# Patient Record
Sex: MALE | Race: WHITE | NOT HISPANIC OR LATINO | Employment: STUDENT | URBAN - METROPOLITAN AREA
[De-identification: names, ages, dates, MRNs, and addresses within clinical notes are randomized per-mention and may not be internally consistent; named-entity substitution may affect disease eponyms.]

---
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
## 2022-10-24 ENCOUNTER — OFFICE VISIT (OUTPATIENT)
Dept: URGENT CARE | Facility: CLINIC | Age: 22
End: 2022-10-24
Payer: COMMERCIAL

## 2022-10-24 VITALS
DIASTOLIC BLOOD PRESSURE: 85 MMHG | RESPIRATION RATE: 16 BRPM | WEIGHT: 117 LBS | BODY MASS INDEX: 19.97 KG/M2 | OXYGEN SATURATION: 98 % | HEIGHT: 64 IN | SYSTOLIC BLOOD PRESSURE: 134 MMHG | HEART RATE: 95 BPM | TEMPERATURE: 100 F

## 2022-10-24 DIAGNOSIS — E86.0 DEHYDRATION: ICD-10-CM

## 2022-10-24 DIAGNOSIS — R11.10 VOMITING, UNSPECIFIED VOMITING TYPE, UNSPECIFIED WHETHER NAUSEA PRESENT: Primary | ICD-10-CM

## 2022-10-24 DIAGNOSIS — R50.9 FEVER, UNSPECIFIED FEVER CAUSE: ICD-10-CM

## 2022-10-24 DIAGNOSIS — R19.7 DIARRHEA, UNSPECIFIED TYPE: ICD-10-CM

## 2022-10-24 LAB
CTP QC/QA: YES
CTP QC/QA: YES
POC MOLECULAR INFLUENZA A AGN: NEGATIVE
POC MOLECULAR INFLUENZA B AGN: NEGATIVE
SARS-COV-2 RDRP RESP QL NAA+PROBE: NEGATIVE

## 2022-10-24 PROCEDURE — 1159F MED LIST DOCD IN RCRD: CPT | Mod: CPTII,S$GLB,, | Performed by: FAMILY MEDICINE

## 2022-10-24 PROCEDURE — 87635: ICD-10-PCS | Mod: QW,S$GLB,, | Performed by: FAMILY MEDICINE

## 2022-10-24 PROCEDURE — 3075F PR MOST RECENT SYSTOLIC BLOOD PRESS GE 130-139MM HG: ICD-10-PCS | Mod: CPTII,S$GLB,, | Performed by: FAMILY MEDICINE

## 2022-10-24 PROCEDURE — 3075F SYST BP GE 130 - 139MM HG: CPT | Mod: CPTII,S$GLB,, | Performed by: FAMILY MEDICINE

## 2022-10-24 PROCEDURE — 1160F PR REVIEW ALL MEDS BY PRESCRIBER/CLIN PHARMACIST DOCUMENTED: ICD-10-PCS | Mod: CPTII,S$GLB,, | Performed by: FAMILY MEDICINE

## 2022-10-24 PROCEDURE — 96372 PR INJECTION,THERAP/PROPH/DIAG2ST, IM OR SUBCUT: ICD-10-PCS | Mod: S$GLB,,, | Performed by: FAMILY MEDICINE

## 2022-10-24 PROCEDURE — 3079F PR MOST RECENT DIASTOLIC BLOOD PRESSURE 80-89 MM HG: ICD-10-PCS | Mod: CPTII,S$GLB,, | Performed by: FAMILY MEDICINE

## 2022-10-24 PROCEDURE — 99214 PR OFFICE/OUTPT VISIT, EST, LEVL IV, 30-39 MIN: ICD-10-PCS | Mod: 25,S$GLB,, | Performed by: FAMILY MEDICINE

## 2022-10-24 PROCEDURE — 87502 POCT INFLUENZA A/B MOLECULAR: ICD-10-PCS | Mod: QW,S$GLB,, | Performed by: FAMILY MEDICINE

## 2022-10-24 PROCEDURE — 87635 SARS-COV-2 COVID-19 AMP PRB: CPT | Mod: QW,S$GLB,, | Performed by: FAMILY MEDICINE

## 2022-10-24 PROCEDURE — 87502 INFLUENZA DNA AMP PROBE: CPT | Mod: QW,S$GLB,, | Performed by: FAMILY MEDICINE

## 2022-10-24 PROCEDURE — 1160F RVW MEDS BY RX/DR IN RCRD: CPT | Mod: CPTII,S$GLB,, | Performed by: FAMILY MEDICINE

## 2022-10-24 PROCEDURE — 99214 OFFICE O/P EST MOD 30 MIN: CPT | Mod: 25,S$GLB,, | Performed by: FAMILY MEDICINE

## 2022-10-24 PROCEDURE — 1159F PR MEDICATION LIST DOCUMENTED IN MEDICAL RECORD: ICD-10-PCS | Mod: CPTII,S$GLB,, | Performed by: FAMILY MEDICINE

## 2022-10-24 PROCEDURE — 3079F DIAST BP 80-89 MM HG: CPT | Mod: CPTII,S$GLB,, | Performed by: FAMILY MEDICINE

## 2022-10-24 PROCEDURE — 96372 THER/PROPH/DIAG INJ SC/IM: CPT | Mod: S$GLB,,, | Performed by: FAMILY MEDICINE

## 2022-10-24 RX ORDER — ONDANSETRON 2 MG/ML
8 INJECTION INTRAMUSCULAR; INTRAVENOUS
Status: DISCONTINUED | OUTPATIENT
Start: 2022-10-24 | End: 2022-10-24

## 2022-10-24 RX ORDER — SODIUM CHLORIDE 9 MG/ML
INJECTION, SOLUTION INTRAVENOUS
Status: COMPLETED | OUTPATIENT
Start: 2022-10-24 | End: 2022-10-24

## 2022-10-24 RX ORDER — ONDANSETRON 2 MG/ML
4 INJECTION INTRAMUSCULAR; INTRAVENOUS
Status: COMPLETED | OUTPATIENT
Start: 2022-10-24 | End: 2022-10-24

## 2022-10-24 RX ORDER — ONDANSETRON 4 MG/1
4 TABLET, ORALLY DISINTEGRATING ORAL EVERY 8 HOURS PRN
Qty: 9 TABLET | Refills: 0 | Status: SHIPPED | OUTPATIENT
Start: 2022-10-24 | End: 2022-10-27

## 2022-10-24 RX ADMIN — ONDANSETRON 4 MG: 2 INJECTION INTRAMUSCULAR; INTRAVENOUS at 04:10

## 2022-10-24 RX ADMIN — SODIUM CHLORIDE: 9 INJECTION, SOLUTION INTRAVENOUS at 04:10

## 2022-10-24 NOTE — PROGRESS NOTES
"Subjective:       Patient ID: Lane Blancas is a 22 y.o. male.    Vitals:  height is 5' 4" (1.626 m) and weight is 53.1 kg (117 lb). His temperature is 100.3 °F (37.9 °C). His blood pressure is 134/85 and his pulse is 95. His respiration is 16 and oxygen saturation is 98%.     Chief Complaint: Emesis    Patient reports vomiting, diarrhea, chills, fatigue and body aches that started at 6am.  Pt denies  CP, SOB, abdominal pain, dysuria, loss of smell or taste.        Emesis   This is a new problem. The current episode started today. The emesis has an appearance of bile and stomach contents. There has been no fever.     Gastrointestinal:  Positive for vomiting.     Objective:      Physical Exam   Constitutional: He is oriented to person, place, and time. He appears well-developed. He is cooperative.  Non-toxic appearance. He does not appear ill. No distress.   HENT:   Head: Normocephalic and atraumatic.   Ears:   Right Ear: Hearing, tympanic membrane, external ear and ear canal normal. impacted cerumen  Left Ear: Hearing, tympanic membrane, external ear and ear canal normal. impacted cerumen  Nose: Nose normal. No mucosal edema, rhinorrhea, nasal deformity or congestion. No epistaxis. Right sinus exhibits no maxillary sinus tenderness and no frontal sinus tenderness. Left sinus exhibits no maxillary sinus tenderness and no frontal sinus tenderness.   Mouth/Throat: Uvula is midline, oropharynx is clear and moist and mucous membranes are normal. No trismus in the jaw. Normal dentition. No uvula swelling. No oropharyngeal exudate, posterior oropharyngeal edema or posterior oropharyngeal erythema.   Eyes: Conjunctivae and lids are normal. No scleral icterus.   Neck: Trachea normal and phonation normal. Neck supple. No edema present. No erythema present. No neck rigidity present.   Cardiovascular: Normal rate, regular rhythm, normal heart sounds and normal pulses.   No murmur heard.  Pulmonary/Chest: Effort normal " and breath sounds normal. No stridor. No respiratory distress. He has no decreased breath sounds. He has no wheezes. He has no rhonchi. He has no rales.   Abdominal: Normal appearance.   Musculoskeletal: Normal range of motion.         General: No deformity. Normal range of motion.      Cervical back: He exhibits no tenderness.   Lymphadenopathy:     He has no cervical adenopathy.   Neurological: He is alert and oriented to person, place, and time. He exhibits normal muscle tone. Coordination normal.   Skin: Skin is warm, dry, intact, not diaphoretic and not pale.   Psychiatric: His speech is normal and behavior is normal. Judgment and thought content normal.   Nursing note and vitals reviewed.      Assessment:       1. Vomiting, unspecified vomiting type, unspecified whether nausea present    2. Fever, unspecified fever cause    3. Diarrhea, unspecified type    4. Dehydration          Plan:     Patient is feeling much better after IV fluids.   Discussed results/diagnosis/plan with patient in clinic. Advised to f/u with PCP within 2-5 days. ER precautions given if symptoms get any worse. All questions answered. Patient verbally understood and agreed with treatment plan.   Vomiting, unspecified vomiting type, unspecified whether nausea present  -     POCT Influenza A/B MOLECULAR  -     0.9%  NaCl infusion    Fever, unspecified fever cause  -     POCT COVID-19 Rapid Screening    Diarrhea, unspecified type  -     0.9%  NaCl infusion    Dehydration    Other orders  -     Discontinue: ondansetron injection 8 mg  -     ondansetron injection 4 mg  -     ondansetron (ZOFRAN-ODT) 4 MG TbDL; Take 1 tablet (4 mg total) by mouth every 8 (eight) hours as needed (nausea).  Dispense: 9 tablet; Refill: 0

## 2022-11-30 ENCOUNTER — OFFICE VISIT (OUTPATIENT)
Dept: INTERNAL MEDICINE | Facility: CLINIC | Age: 22
End: 2022-11-30
Payer: COMMERCIAL

## 2022-11-30 ENCOUNTER — TELEPHONE (OUTPATIENT)
Dept: INTERNAL MEDICINE | Facility: CLINIC | Age: 22
End: 2022-11-30

## 2022-11-30 DIAGNOSIS — Z01.89 ENCOUNTER FOR ROUTINE LABORATORY TESTING: ICD-10-CM

## 2022-11-30 DIAGNOSIS — F41.9 ANXIETY: ICD-10-CM

## 2022-11-30 DIAGNOSIS — F90.0 ATTENTION DEFICIT HYPERACTIVITY DISORDER (ADHD), PREDOMINANTLY INATTENTIVE TYPE: Primary | ICD-10-CM

## 2022-11-30 PROCEDURE — 99202 PR OFFICE/OUTPT VISIT, NEW, LEVL II, 15-29 MIN: ICD-10-PCS | Mod: 95,,, | Performed by: NURSE PRACTITIONER

## 2022-11-30 PROCEDURE — 1160F PR REVIEW ALL MEDS BY PRESCRIBER/CLIN PHARMACIST DOCUMENTED: ICD-10-PCS | Mod: CPTII,95,, | Performed by: NURSE PRACTITIONER

## 2022-11-30 PROCEDURE — 1160F RVW MEDS BY RX/DR IN RCRD: CPT | Mod: CPTII,95,, | Performed by: NURSE PRACTITIONER

## 2022-11-30 PROCEDURE — 99202 OFFICE O/P NEW SF 15 MIN: CPT | Mod: 95,,, | Performed by: NURSE PRACTITIONER

## 2022-11-30 PROCEDURE — 1159F MED LIST DOCD IN RCRD: CPT | Mod: CPTII,95,, | Performed by: NURSE PRACTITIONER

## 2022-11-30 PROCEDURE — 1159F PR MEDICATION LIST DOCUMENTED IN MEDICAL RECORD: ICD-10-PCS | Mod: CPTII,95,, | Performed by: NURSE PRACTITIONER

## 2022-11-30 NOTE — TELEPHONE ENCOUNTER
----- Message from Lina Holm NP sent at 11/30/2022 12:51 PM CST -----  Please get patient scheduled for visit to have annual ASAP

## 2022-11-30 NOTE — TELEPHONE ENCOUNTER
Spoke to pt's mother and she advised to leave message on [employer] for scheduling annual. Pt given clinic # [phone].

## 2022-11-30 NOTE — PROGRESS NOTES
Subjective:       Patient ID: Lane Blancas is a 22 y.o. male.    Chief Complaint: Medication Refill    Visit type: audiovisual    Lane Blancas is a 22 y.o. male who presents today for medication refill. Was seeing Dr. Penaloza (child psych) and their last visit was in June of 2021. Since that time patient has not been on medications.   Last saw pediatrician in January of 2020.     The patient location is: [place]    Answers submitted by the patient for this visit:  Review of Systems Questionnaire (Submitted on 11/30/2022)  activity change: No  unexpected weight change: No  rhinorrhea: No  trouble swallowing: No  visual disturbance: No  chest tightness: No  polyuria: No  difficulty urinating: No  joint swelling: No  arthralgias: No  confusion: No  dysphoric mood: No    Past Medical History:   Diagnosis Date    ADHD     diagnosed 7th grade    Seizures     2-5 years old     History reviewed. No pertinent surgical history.  Social History     Socioeconomic History    Marital status: Single   Tobacco Use    Smoking status: Never    Smokeless tobacco: Never   Substance and Sexual Activity    Alcohol use: Yes    Drug use: Not Currently     Types: Marijuana    Sexual activity: Yes     Partners: Female     Birth control/protection: Condom   Social History Narrative    Moved 6/2018 from [place], [place].  Mother a pediatric hospitalist.  Lives with mother, father, 1 cat; 3 siblings out of state in college ([employer], [place]); no smoking, smoke detectors (currently renting), no firearms     History reviewed. No pertinent family history.    Review of patient's allergies indicates:  No Known Allergies    Medication List with Changes/Refills   Current Medications    METHYLPHENIDATE HCL 18 MG CR TABLET    Take 1 tablet (18 mg total) by mouth every morning.    SERTRALINE (ZOLOFT) 50 MG TABLET    Take one tablet by mouth weekly for 14 days then go to 1/2 tablet daily for 14 days and then discontinue  Zoloft.     Review of Systems   HENT:  Negative for hearing loss.    Eyes:  Negative for discharge.   Respiratory:  Negative for wheezing.    Cardiovascular:  Negative for chest pain and palpitations.   Gastrointestinal:  Negative for blood in stool, constipation, diarrhea and vomiting.   Genitourinary:  Negative for hematuria and urgency.   Musculoskeletal:  Negative for neck pain.   Neurological:  Negative for weakness and headaches.   Endo/Heme/Allergies:  Negative for polydipsia.     Objective:   There were no vitals taken for this visit.    Physical Exam  Vitals reviewed: Exam Limited due to Video Consultation.   Constitutional:       General: He is not in acute distress.  HENT:      Head: Normocephalic.   Pulmonary:      Effort: Pulmonary effort is normal.   Neurological:      Mental Status: He is alert.     Assessment and Plan:       1. Attention deficit hyperactivity disorder (ADHD), predominantly inattentive type    Patient advised that given he is new to the adult system and I have never seen him in person I would unable to send in controlled substance prescription. Will have him schedule appointment for annual in clinic.     2. Anxiety    3. Encounter for routine laboratory testing    - CBC Auto Differential; Future  - Comprehensive Metabolic Panel; Future  - Hepatitis C Antibody; Future  - HIV 1/2 Ag/Ab (4th Gen); Future  - Lipid Panel; Future  - TSH; Future      Face to Face time with patient: 4  10 minutes of total time spent on the encounter, which includes face to face time and non-face to face time preparing to see the patient (eg, review of tests), Obtaining and/or reviewing separately obtained history, Documenting clinical information in the electronic or other health record, Independently interpreting results (not separately reported) and communicating results to the patient/family/caregiver, or Care coordination (not separately reported).     Each patient to whom he or she provides medical  services by telemedicine is:  (1) informed of the relationship between the physician and patient and the respective role of any other health care provider with respect to management of the patient; and (2) notified that he or she may decline to receive medical services by telemedicine and may withdraw from such care at any time.

## 2022-12-09 ENCOUNTER — TELEPHONE (OUTPATIENT)
Dept: INTERNAL MEDICINE | Facility: CLINIC | Age: 22
End: 2022-12-09
Payer: COMMERCIAL

## 2022-12-19 NOTE — PROGRESS NOTES
Subjective:       Patient ID: Lane Blancas is a 22 y.o. male.    Chief Complaint: Annual Wellness Visit      Lane Blancas is a 22 y.o. male who presents today for an annual wellness visit.     Patient presents today for annual and to discuss getting back on Concerta. Last fill 6/16/22. States this last semester he barely passed. In [email or web address] school at [employer] and finds he is having a hard time sitting still and focusing.     Reports he is off Zoloft and though he still has some anxiety it is well controlled off medication.     Review of patient's allergies indicates:  No Known Allergies   Medication List with Changes/Refills   New Medications    METHYLPHENIDATE HCL 18 MG CR TABLET    Take 1 tablet (18 mg total) by mouth every morning.    METHYLPHENIDATE HCL 18 MG CR TABLET    Take 1 tablet (18 mg total) by mouth every morning.   Changed and/or Refilled Medications    Modified Medication Previous Medication    METHYLPHENIDATE HCL 18 MG CR TABLET methylphenidate HCl 18 MG CR tablet       Take 1 tablet (18 mg total) by mouth every morning.    Take 1 tablet (18 mg total) by mouth every morning.   Discontinued Medications    SERTRALINE (ZOLOFT) 50 MG TABLET    Take one tablet by mouth weekly for 14 days then go to 1/2 tablet daily for 14 days and then discontinue Zoloft.       Health Maintenance    Flu Vaccine: 12/20/2022  Tetanus/Tdap: 07/22/2013  Hepatitis C Screen: 12/20/2022   HIV Screen: 12/20/2022       Patient ambulates on his own without an assistive device.     On average, how many days per week do you do moderate to strenuous exercise:  (like a brisk walk or jog; this does not include your job/work)  5-6     On average, how many minutes do you exercise at this level each day? 60    Do you eat fruits and vegetable every day?  Often, but not daily    Have you ever used tobacco products? Yes    Have you ever been screened for HIV? No  Would you like to be screened for HIV? Yes    Do you use condoms for  "protection against STD Yes       If yes,  Sometimes     Do you go to the dentist regularly? No  When was you last exam:     Do you go to the eye doctor regularly? No  When was your last exam:    Do you wear contacts, glasses, reading glasses? No    Have you often been bothered by feeling down, depressed, or hopeless?  Not at all    Have you often been bothered by having little interest or pleasure in doing things?  Not at all    How often do you drink alcohol?  weekly    How often have you used Marijuana or other illicit drugs in the past year?  less than monthly    Review of Systems   Constitutional:  Negative for chills and fever.   Respiratory:  Negative for cough and shortness of breath.    Cardiovascular:  Negative for chest pain.   Neurological:  Negative for dizziness and headaches.      Objective:     /78 (BP Location: Right arm, Patient Position: Sitting, BP Method: Small (Manual))   Pulse 61   Temp 97.3 °F (36.3 °C) (Temporal)   Resp 20   Ht 5' 5" (1.651 m)   Wt 63.6 kg (140 lb 3.4 oz)   SpO2 99%   BMI 23.33 kg/m²     Physical Exam  Vitals reviewed.   Constitutional:       Appearance: Normal appearance.   HENT:      Head: Normocephalic and atraumatic.   Cardiovascular:      Rate and Rhythm: Normal rate and regular rhythm.      Heart sounds: Normal heart sounds. No murmur heard.  Pulmonary:      Effort: Pulmonary effort is normal.      Breath sounds: Normal breath sounds. No wheezing.   Skin:     General: Skin is warm and dry.   Neurological:      Mental Status: He is alert and oriented to person, place, and time.     BP Readings from Last 3 Encounters:   12/20/22 112/78   10/24/22 134/85   01/20/20 120/76 (71 %, Z = 0.55 /  88 %, Z = 1.17)*     *BP percentiles are based on the 2017 AAP Clinical Practice Guideline for boys     Wt Readings from Last 3 Encounters:   12/20/22 63.6 kg (140 lb 3.4 oz)   10/24/22 53.1 kg (117 lb)   01/20/20 53.3 kg (117 lb 6.3 oz) (5 %, Z= -1.60)*     * Growth " percentiles are based on [facility] (Boys, 2-20 Years) data.       Last Labs:  No results found for: GLU  No results found for: BUN  No results found for: CREATININE  Cholesterol   Date Value Ref Range Status   09/19/2018 113 (L) 120 - 199 mg/dL Final     Comment:     The National Cholesterol Education Program (NCEP) has set the  following guidelines (reference ranges) for Cholesterol:  Optimal.....................<200 mg/dL  Borderline High.............200-239 mg/dL  High........................> or = 240 mg/dL       No results found for: HGBA1C  Hemoglobin   Date Value Ref Range Status   01/20/2020 14.3 13.0 - 16.0 g/dL Final   09/19/2018 14.7 13.0 - 16.0 g/dL Final     Hematocrit   Date Value Ref Range Status   01/20/2020 40.3 37.0 - 47.0 % Final   09/19/2018 42.3 37.0 - 47.0 % Final     No results found for: LNROAHEB11VN    I have reviewed the following:     Details / Date    [x]   Labs     []   Micro     []   Pathology     []   Imaging     []   Cardiology Procedures     [x]   Other  Patient's Medical and Surgical History        Assessment and Plan:     1. Encounter for annual health examination    --Nutrition: Discussed the importance of moderate caffeine intake. Adhering to a low sodium, low saturated fat/low cholesterol diet.   --Exercise: Discussed the importance of regular exercise. At least 30 minutes 5 days per week.   --Sexuality: Discussed sexually transmitted diseases, use of condoms and contraceptive alternatives.   --Immunizations reviewed.  --Discussed benefits of maintaining up to date health maintenance.    - RPR; Future  - C. trachomatis/N. gonorrhoeae by AMP DNA [facility]; Urine; Future    2. Attention deficit hyperactivity disorder (ADHD), predominantly inattentive type    Chronic, resume medication, establish with Psych and/or PCP    - methylphenidate HCl 18 MG CR tablet; Take 1 tablet (18 mg total) by mouth every morning.  Dispense: 30 tablet; Refill: 0  - methylphenidate HCl 18 MG CR tablet; Take 1  tablet (18 mg total) by mouth every morning.  Dispense: 30 tablet; Refill: 0  - methylphenidate HCl 18 MG CR tablet; Take 1 tablet (18 mg total) by mouth every morning.  Dispense: 30 tablet; Refill: 0    3. Anxiety    Chronic, stable.

## 2022-12-20 ENCOUNTER — LAB VISIT (OUTPATIENT)
Dept: LAB | Facility: HOSPITAL | Age: 22
End: 2022-12-20
Payer: COMMERCIAL

## 2022-12-20 ENCOUNTER — OFFICE VISIT (OUTPATIENT)
Dept: INTERNAL MEDICINE | Facility: CLINIC | Age: 22
End: 2022-12-20
Payer: COMMERCIAL

## 2022-12-20 VITALS
SYSTOLIC BLOOD PRESSURE: 112 MMHG | RESPIRATION RATE: 20 BRPM | DIASTOLIC BLOOD PRESSURE: 78 MMHG | WEIGHT: 140.19 LBS | HEART RATE: 61 BPM | BODY MASS INDEX: 23.36 KG/M2 | TEMPERATURE: 97 F | HEIGHT: 65 IN | OXYGEN SATURATION: 99 %

## 2022-12-20 DIAGNOSIS — F41.9 ANXIETY: ICD-10-CM

## 2022-12-20 DIAGNOSIS — Z00.00 ENCOUNTER FOR ANNUAL HEALTH EXAMINATION: Primary | ICD-10-CM

## 2022-12-20 DIAGNOSIS — Z00.00 ENCOUNTER FOR ANNUAL HEALTH EXAMINATION: ICD-10-CM

## 2022-12-20 DIAGNOSIS — F90.0 ATTENTION DEFICIT HYPERACTIVITY DISORDER (ADHD), PREDOMINANTLY INATTENTIVE TYPE: ICD-10-CM

## 2022-12-20 LAB
C TRACH DNA SPEC QL NAA+PROBE: NOT DETECTED
N GONORRHOEA DNA SPEC QL NAA+PROBE: NOT DETECTED

## 2022-12-20 PROCEDURE — 90686 IIV4 VACC NO PRSV 0.5 ML IM: CPT | Mod: S$GLB,,, | Performed by: NURSE PRACTITIONER

## 2022-12-20 PROCEDURE — 99395 PR PREVENTIVE VISIT,EST,18-39: ICD-10-PCS | Mod: 25,S$GLB,, | Performed by: NURSE PRACTITIONER

## 2022-12-20 PROCEDURE — 3074F PR MOST RECENT SYSTOLIC BLOOD PRESSURE < 130 MM HG: ICD-10-PCS | Mod: CPTII,S$GLB,, | Performed by: NURSE PRACTITIONER

## 2022-12-20 PROCEDURE — 90471 FLU VACCINE (QUAD) GREATER THAN OR EQUAL TO 3YO PRESERVATIVE FREE IM: ICD-10-PCS | Mod: S$GLB,,, | Performed by: NURSE PRACTITIONER

## 2022-12-20 PROCEDURE — 87491 CHLMYD TRACH DNA AMP PROBE: CPT | Performed by: NURSE PRACTITIONER

## 2022-12-20 PROCEDURE — 3008F PR BODY MASS INDEX (BMI) DOCUMENTED: ICD-10-PCS | Mod: CPTII,S$GLB,, | Performed by: NURSE PRACTITIONER

## 2022-12-20 PROCEDURE — 1159F PR MEDICATION LIST DOCUMENTED IN MEDICAL RECORD: ICD-10-PCS | Mod: CPTII,S$GLB,, | Performed by: NURSE PRACTITIONER

## 2022-12-20 PROCEDURE — 99999 PR PBB SHADOW E&M-EST. PATIENT-LVL III: ICD-10-PCS | Mod: PBBFAC,,, | Performed by: NURSE PRACTITIONER

## 2022-12-20 PROCEDURE — 3078F DIAST BP <80 MM HG: CPT | Mod: CPTII,S$GLB,, | Performed by: NURSE PRACTITIONER

## 2022-12-20 PROCEDURE — 1160F RVW MEDS BY RX/DR IN RCRD: CPT | Mod: CPTII,S$GLB,, | Performed by: NURSE PRACTITIONER

## 2022-12-20 PROCEDURE — 99395 PREV VISIT EST AGE 18-39: CPT | Mod: 25,S$GLB,, | Performed by: NURSE PRACTITIONER

## 2022-12-20 PROCEDURE — 99999 PR PBB SHADOW E&M-EST. PATIENT-LVL III: CPT | Mod: PBBFAC,,, | Performed by: NURSE PRACTITIONER

## 2022-12-20 PROCEDURE — 3078F PR MOST RECENT DIASTOLIC BLOOD PRESSURE < 80 MM HG: ICD-10-PCS | Mod: CPTII,S$GLB,, | Performed by: NURSE PRACTITIONER

## 2022-12-20 PROCEDURE — 3008F BODY MASS INDEX DOCD: CPT | Mod: CPTII,S$GLB,, | Performed by: NURSE PRACTITIONER

## 2022-12-20 PROCEDURE — 87591 N.GONORRHOEAE DNA AMP PROB: CPT | Performed by: NURSE PRACTITIONER

## 2022-12-20 PROCEDURE — 90686 FLU VACCINE (QUAD) GREATER THAN OR EQUAL TO 3YO PRESERVATIVE FREE IM: ICD-10-PCS | Mod: S$GLB,,, | Performed by: NURSE PRACTITIONER

## 2022-12-20 PROCEDURE — 90471 IMMUNIZATION ADMIN: CPT | Mod: S$GLB,,, | Performed by: NURSE PRACTITIONER

## 2022-12-20 PROCEDURE — 1160F PR REVIEW ALL MEDS BY PRESCRIBER/CLIN PHARMACIST DOCUMENTED: ICD-10-PCS | Mod: CPTII,S$GLB,, | Performed by: NURSE PRACTITIONER

## 2022-12-20 PROCEDURE — 3074F SYST BP LT 130 MM HG: CPT | Mod: CPTII,S$GLB,, | Performed by: NURSE PRACTITIONER

## 2022-12-20 PROCEDURE — 1159F MED LIST DOCD IN RCRD: CPT | Mod: CPTII,S$GLB,, | Performed by: NURSE PRACTITIONER

## 2022-12-20 RX ORDER — METHYLPHENIDATE HYDROCHLORIDE 18 MG/1
18 TABLET ORAL EVERY MORNING
Qty: 30 TABLET | Refills: 0 | Status: SHIPPED | OUTPATIENT
Start: 2022-12-20 | End: 2023-01-19

## 2022-12-20 RX ORDER — METHYLPHENIDATE HYDROCHLORIDE 18 MG/1
18 TABLET ORAL EVERY MORNING
Qty: 30 TABLET | Refills: 0 | Status: SHIPPED | OUTPATIENT
Start: 2023-02-20 | End: 2023-03-06

## 2022-12-20 RX ORDER — METHYLPHENIDATE HYDROCHLORIDE 18 MG/1
18 TABLET ORAL EVERY MORNING
Qty: 30 TABLET | Refills: 0 | Status: SHIPPED | OUTPATIENT
Start: 2023-01-20 | End: 2023-03-03 | Stop reason: SDUPTHER

## 2023-03-06 NOTE — PROGRESS NOTES
Subjective:       Patient ID: Lane Blancas is a 23 y.o. male.    Chief Complaint: ADHD    Pt here for f/u.     Pt here for f/u ADHD. Doing well with concerta and not having side effects--prior record reviewed. Specifically denies cp/sob/palpitaitons/anorexia/wt loss/insomnia. LA  reviewed and is consistent. It is effective for his ADHD symptoms. No SI/HI.       Review of Systems   Constitutional:  Negative for unexpected weight change.   Respiratory:  Negative for shortness of breath.    Cardiovascular:  Negative for chest pain, palpitations and leg swelling.   Psychiatric/Behavioral:  Negative for dysphoric mood and suicidal ideas.        Objective:      Physical Exam  Constitutional:       Appearance: Normal appearance.   Cardiovascular:      Rate and Rhythm: Normal rate and regular rhythm.      Heart sounds: Normal heart sounds.   Pulmonary:      Effort: Pulmonary effort is normal.      Breath sounds: Normal breath sounds.   Neurological:      Mental Status: He is alert.   Psychiatric:         Mood and Affect: Mood normal.         Behavior: Behavior normal.         Thought Content: Thought content normal.         Judgment: Judgment normal.       Assessment:       Problem List Items Addressed This Visit          Psychiatric    Anxiety    Attention deficit hyperactivity disorder (ADHD), predominantly inattentive type - Primary         Plan:       Refill concerta; proper use d/w pt; will refill over next 2-3 mos as he est care with new PCP  Assist pt in est care with PCP taking new pts

## 2023-03-07 ENCOUNTER — OFFICE VISIT (OUTPATIENT)
Dept: INTERNAL MEDICINE | Facility: CLINIC | Age: 23
End: 2023-03-07
Payer: COMMERCIAL

## 2023-03-07 VITALS
BODY MASS INDEX: 22.35 KG/M2 | OXYGEN SATURATION: 100 % | HEART RATE: 75 BPM | HEIGHT: 65 IN | WEIGHT: 134.13 LBS | SYSTOLIC BLOOD PRESSURE: 124 MMHG | DIASTOLIC BLOOD PRESSURE: 78 MMHG

## 2023-03-07 DIAGNOSIS — F90.0 ATTENTION DEFICIT HYPERACTIVITY DISORDER (ADHD), PREDOMINANTLY INATTENTIVE TYPE: Primary | ICD-10-CM

## 2023-03-07 DIAGNOSIS — F41.9 ANXIETY: ICD-10-CM

## 2023-03-07 PROCEDURE — 3078F DIAST BP <80 MM HG: CPT | Mod: CPTII,S$GLB,, | Performed by: INTERNAL MEDICINE

## 2023-03-07 PROCEDURE — 3074F PR MOST RECENT SYSTOLIC BLOOD PRESSURE < 130 MM HG: ICD-10-PCS | Mod: CPTII,S$GLB,, | Performed by: INTERNAL MEDICINE

## 2023-03-07 PROCEDURE — 3008F BODY MASS INDEX DOCD: CPT | Mod: CPTII,S$GLB,, | Performed by: INTERNAL MEDICINE

## 2023-03-07 PROCEDURE — 3078F PR MOST RECENT DIASTOLIC BLOOD PRESSURE < 80 MM HG: ICD-10-PCS | Mod: CPTII,S$GLB,, | Performed by: INTERNAL MEDICINE

## 2023-03-07 PROCEDURE — 99999 PR PBB SHADOW E&M-EST. PATIENT-LVL III: CPT | Mod: PBBFAC,,, | Performed by: INTERNAL MEDICINE

## 2023-03-07 PROCEDURE — 99213 OFFICE O/P EST LOW 20 MIN: CPT | Mod: S$GLB,,, | Performed by: INTERNAL MEDICINE

## 2023-03-07 PROCEDURE — 3074F SYST BP LT 130 MM HG: CPT | Mod: CPTII,S$GLB,, | Performed by: INTERNAL MEDICINE

## 2023-03-07 PROCEDURE — 1160F RVW MEDS BY RX/DR IN RCRD: CPT | Mod: CPTII,S$GLB,, | Performed by: INTERNAL MEDICINE

## 2023-03-07 PROCEDURE — 1159F MED LIST DOCD IN RCRD: CPT | Mod: CPTII,S$GLB,, | Performed by: INTERNAL MEDICINE

## 2023-03-07 PROCEDURE — 1160F PR REVIEW ALL MEDS BY PRESCRIBER/CLIN PHARMACIST DOCUMENTED: ICD-10-PCS | Mod: CPTII,S$GLB,, | Performed by: INTERNAL MEDICINE

## 2023-03-07 PROCEDURE — 1159F PR MEDICATION LIST DOCUMENTED IN MEDICAL RECORD: ICD-10-PCS | Mod: CPTII,S$GLB,, | Performed by: INTERNAL MEDICINE

## 2023-03-07 PROCEDURE — 99999 PR PBB SHADOW E&M-EST. PATIENT-LVL III: ICD-10-PCS | Mod: PBBFAC,,, | Performed by: INTERNAL MEDICINE

## 2023-03-07 PROCEDURE — 3008F PR BODY MASS INDEX (BMI) DOCUMENTED: ICD-10-PCS | Mod: CPTII,S$GLB,, | Performed by: INTERNAL MEDICINE

## 2023-03-07 PROCEDURE — 99213 PR OFFICE/OUTPT VISIT, EST, LEVL III, 20-29 MIN: ICD-10-PCS | Mod: S$GLB,,, | Performed by: INTERNAL MEDICINE

## 2023-03-07 RX ORDER — METHYLPHENIDATE HYDROCHLORIDE 18 MG/1
18 TABLET ORAL EVERY MORNING
Qty: 30 TABLET | Refills: 0 | Status: SHIPPED | OUTPATIENT
Start: 2023-03-07 | End: 2023-03-20 | Stop reason: SDUPTHER

## 2023-04-24 ENCOUNTER — HOSPITAL ENCOUNTER (EMERGENCY)
Facility: HOSPITAL | Age: 23
Discharge: HOME | End: 2023-04-24
Attending: EMERGENCY MEDICINE
Payer: COMMERCIAL

## 2023-04-24 VITALS
RESPIRATION RATE: 18 BRPM | HEIGHT: 65 IN | WEIGHT: 130 LBS | OXYGEN SATURATION: 98 % | HEART RATE: 77 BPM | TEMPERATURE: 99 F | BODY MASS INDEX: 21.66 KG/M2

## 2023-04-24 DIAGNOSIS — S61.011A LACERATION OF RIGHT THUMB WITHOUT FOREIGN BODY WITHOUT DAMAGE TO NAIL, INITIAL ENCOUNTER: Primary | ICD-10-CM

## 2023-04-24 DIAGNOSIS — S66.292S: ICD-10-CM

## 2023-04-24 PROCEDURE — 99284 EMERGENCY DEPT VISIT MOD MDM: CPT | Mod: ,,, | Performed by: EMERGENCY MEDICINE

## 2023-04-24 PROCEDURE — 99284 EMERGENCY DEPT VISIT MOD MDM: CPT

## 2023-04-24 PROCEDURE — 63600175 PHARM REV CODE 636 W HCPCS

## 2023-04-24 PROCEDURE — 25000003 PHARM REV CODE 250

## 2023-04-24 PROCEDURE — 99284 PR EMERGENCY DEPT VISIT,LEVEL IV: ICD-10-PCS | Mod: ,,, | Performed by: EMERGENCY MEDICINE

## 2023-04-24 PROCEDURE — 90715 TDAP VACCINE 7 YRS/> IM: CPT

## 2023-04-24 PROCEDURE — 90471 IMMUNIZATION ADMIN: CPT

## 2023-04-24 RX ORDER — SULFAMETHOXAZOLE AND TRIMETHOPRIM 800; 160 MG/1; MG/1
1 TABLET ORAL 2 TIMES DAILY
Qty: 10 TABLET | Refills: 0 | Status: SHIPPED | OUTPATIENT
Start: 2023-04-24 | End: 2023-04-29

## 2023-04-24 RX ORDER — SULFAMETHOXAZOLE AND TRIMETHOPRIM 800; 160 MG/1; MG/1
1 TABLET ORAL
Status: COMPLETED | OUTPATIENT
Start: 2023-04-24 | End: 2023-04-24

## 2023-04-24 RX ORDER — LIDOCAINE HYDROCHLORIDE 10 MG/ML
5 INJECTION, SOLUTION EPIDURAL; INFILTRATION; INTRACAUDAL; PERINEURAL
Status: DISCONTINUED | OUTPATIENT
Start: 2023-04-24 | End: 2023-04-25 | Stop reason: HOSPADM

## 2023-04-24 RX ADMIN — SULFAMETHOXAZOLE AND TRIMETHOPRIM 1 TABLET: 800; 160 TABLET ORAL at 09:04

## 2023-04-24 RX ADMIN — TETANUS TOXOID, REDUCED DIPHTHERIA TOXOID AND ACELLULAR PERTUSSIS VACCINE, ADSORBED 0.5 ML: 5; 2.5; 8; 8; 2.5 SUSPENSION INTRAMUSCULAR at 09:04

## 2023-04-25 ENCOUNTER — TELEPHONE (OUTPATIENT)
Dept: ORTHOPEDICS | Facility: CLINIC | Age: 23
End: 2023-04-25
Payer: COMMERCIAL

## 2023-04-25 ENCOUNTER — OFFICE VISIT (OUTPATIENT)
Dept: ORTHOPEDICS | Facility: CLINIC | Age: 23
End: 2023-04-25
Payer: COMMERCIAL

## 2023-04-25 VITALS
BODY MASS INDEX: 21.66 KG/M2 | WEIGHT: 130 LBS | SYSTOLIC BLOOD PRESSURE: 134 MMHG | HEIGHT: 65 IN | HEART RATE: 80 BPM | DIASTOLIC BLOOD PRESSURE: 84 MMHG

## 2023-04-25 DIAGNOSIS — S66.229A LACERATION OF EXTENSOR MUSCLE AND TENDON OF THUMB AT WRIST AND HAND LEVEL: Primary | ICD-10-CM

## 2023-04-25 PROCEDURE — 3008F PR BODY MASS INDEX (BMI) DOCUMENTED: ICD-10-PCS | Mod: CPTII,S$GLB,, | Performed by: ORTHOPAEDIC SURGERY

## 2023-04-25 PROCEDURE — 99205 OFFICE O/P NEW HI 60 MIN: CPT | Mod: S$GLB,,, | Performed by: ORTHOPAEDIC SURGERY

## 2023-04-25 PROCEDURE — 3075F PR MOST RECENT SYSTOLIC BLOOD PRESS GE 130-139MM HG: ICD-10-PCS | Mod: CPTII,S$GLB,, | Performed by: ORTHOPAEDIC SURGERY

## 2023-04-25 PROCEDURE — 3008F BODY MASS INDEX DOCD: CPT | Mod: CPTII,S$GLB,, | Performed by: ORTHOPAEDIC SURGERY

## 2023-04-25 PROCEDURE — 99999 PR PBB SHADOW E&M-EST. PATIENT-LVL IV: CPT | Mod: PBBFAC,,, | Performed by: ORTHOPAEDIC SURGERY

## 2023-04-25 PROCEDURE — 99999 PR PBB SHADOW E&M-EST. PATIENT-LVL IV: ICD-10-PCS | Mod: PBBFAC,,, | Performed by: ORTHOPAEDIC SURGERY

## 2023-04-25 PROCEDURE — 99205 PR OFFICE/OUTPT VISIT, NEW, LEVL V, 60-74 MIN: ICD-10-PCS | Mod: S$GLB,,, | Performed by: ORTHOPAEDIC SURGERY

## 2023-04-25 PROCEDURE — 3079F PR MOST RECENT DIASTOLIC BLOOD PRESSURE 80-89 MM HG: ICD-10-PCS | Mod: CPTII,S$GLB,, | Performed by: ORTHOPAEDIC SURGERY

## 2023-04-25 PROCEDURE — 3075F SYST BP GE 130 - 139MM HG: CPT | Mod: CPTII,S$GLB,, | Performed by: ORTHOPAEDIC SURGERY

## 2023-04-25 PROCEDURE — 3079F DIAST BP 80-89 MM HG: CPT | Mod: CPTII,S$GLB,, | Performed by: ORTHOPAEDIC SURGERY

## 2023-04-25 NOTE — H&P (VIEW-ONLY)
Orthopedic Surgery  Consult Note    Lane Blancas  04/25/2023    CC: left thumb laceration     HPI: Lane Blancas is a 23 y.o. male RHD with PMHx significant for ADHD and anxiety who presents with right thumb laceration. Patients states left thumb was cut by a multitool today around 8pm. Since the injury has been unable to fully extend the thumb. Denies numbness, tingling. Denies any other injury.  Denies prior injuries or surgeries to the right hand.    Past Medical History:   Diagnosis Date    ADHD     diagnosed 7th grade    Anxiety     Seizures     2-5 years old     No past surgical history on file.  No family history on file.  Social History     Socioeconomic History    Marital status: Single   Tobacco Use    Smoking status: Every Day     Types: Vaping with nicotine    Smokeless tobacco: Never   Substance and Sexual Activity    Alcohol use: Yes     Alcohol/week: 12.0 standard drinks     Types: 12 Shots of liquor per week     Comment: 3x weekly    Drug use: Yes     Types: Marijuana    Sexual activity: Yes     Partners: Female     Birth control/protection: Condom   Social History Narrative    Moved 6/2018 from [place], [place].  Mother a pediatric hospitalist.  Lives with mother, father, 1 cat; 3 siblings out of state in college ([facility] [facility], [place]); no smoking, smoke detectors (currently renting), no firearms     No current facility-administered medications on file prior to encounter.     Current Outpatient Medications on File Prior to Encounter   Medication Sig    methylphenidate HCl 18 MG CR tablet Take 1 tablet (18 mg total) by mouth every morning.         Review of Systems:  Constitutional: negative for fevers  Eyes: negative visual changes  ENT: negative for hearing loss  Respiratory: negative for dyspnea  Cardiovascular: negative for chest pain  Gastrointestinal: negative for abdominal pain  Genitourinary: negative for dysuria  Neurological: negative for  headaches  Behavioral/Psych: negative for hallucinations  Endocrine: negative for temperature intolerance  MSK: left thumb laceration    Physical Exam:    Temp:  [99.2 °F (37.3 °C)] 99.2 °F (37.3 °C)  Pulse:  [77] 77  Resp:  [18] 18  SpO2:  [98 %] 98 %    Vitals: Afebrile.  Vital signs stable.  General: No acute distress.  HEENT: Normocephalic. Atraumatic. Sclera anicteric. No tracheal deviation.  Cardio: Regular rate.  Chest: No increased work of breathing.  Abdominal: Nondistended.  Extremities: No cyanosis.  No clubbing.    Skin: No generalized rash.  Neuro: Awake. Alert. Oriented to person, place, time, and situation.  Psych: Normal appearance. Cooperative.  Appropriate mood.  Appropriate affect.      MSK:  Right thumb exam  1cm laceration dorsal MPJ which does appear to be deep but is too small to explore for any lacerated deep structures. Bleeding controlled. Associated TTP at site of laceration and in particular as ulnar aspect of IPJ (over UCL). IPJ feels stable w both ulnar and radial deviation. Able to flex at IP & MPJ. Able to actively extend at MPJ but unable to actively extend at IPJ. Able to abduct and adduct thumb. SILT radial and ulnar aspects of thumb. [place] wwp, brisk cap refill    Otherwise normal exam right hand, wrist, upper extremity      Diagnostic Results: XR right hand w no evidence of fracture or dislocation     Assessment/Plan:  Lane Blancas is a 23 y.o. male with right dorsal thumb laceration over MPJ and inability to extend at IPJ concerning for EPL injury. Also w TTP at UCL concerning for possible UCL injury however no apparent instability w radial deviation of thumb. Bedside I&D and lac repair in ER. Placed in thumb spica splint w thumb in extension. NWB. Tetanus updated. Bactrim given in ER and prescribed for 7d for infection ppx. Will set up for eval in hand clinic.        Procedure Note: Right Thumb Laceration Repair  After time out was performed and patient ID, side, and  site were verified, the  was sterilly prepped in the standard fashion.  3 mL's of 1% lidocaine were injected around the site with a 25-gauge needle. After adequate analgesia was obtained, the wound was irrigated w saline w betadine. At this point, the wound was primarily closed using 4 - 0 Ethilon. The patient tolerated the procedure well with no complications.  Blood loss was minimal.      Ebonie Perry MD  Orthopedic Surgery Resident  04/25/2023

## 2023-04-25 NOTE — HPI
Lane PIZARRO Mauroedgardo is a 23 y.o. old male w/no significant PMH, who presents with pain at his right thumb that began earlier today.  Patient states he was working with a multi-tool when the device suddenly closed on his right thumb near the base.  He experienced pain and some bleeding and was still able to move his thumb normally with the exception of being unable to fully extend at the ip joint.  Patient denies any numbness or tingling.  States he  has never injured this thumb previously.

## 2023-04-25 NOTE — CONSULTS
Orthopedic Surgery  Consult Note    Lane Blancas  04/25/2023    CC: left thumb laceration     HPI: Lane Blancas is a 23 y.o. male RHD with PMHx significant for ADHD and anxiety who presents with right thumb laceration. Patients states left thumb was cut by a multitool today around 8pm. Since the injury has been unable to fully extend the thumb. Denies numbness, tingling. Denies any other injury.  Denies prior injuries or surgeries to the right hand.    Past Medical History:   Diagnosis Date    ADHD     diagnosed 7th grade    Anxiety     Seizures     2-5 years old     No past surgical history on file.  No family history on file.  Social History     Socioeconomic History    Marital status: Single   Tobacco Use    Smoking status: Every Day     Types: Vaping with nicotine    Smokeless tobacco: Never   Substance and Sexual Activity    Alcohol use: Yes     Alcohol/week: 12.0 standard drinks     Types: 12 Shots of liquor per week     Comment: 3x weekly    Drug use: Yes     Types: Marijuana    Sexual activity: Yes     Partners: Female     Birth control/protection: Condom   Social History Narrative    Moved 6/2018 from [place], [place].  Mother a pediatric hospitalist.  Lives with mother, father, 1 cat; 3 siblings out of state in college ([employer], [place]); no smoking, smoke detectors (currently renting), no firearms     No current facility-administered medications on file prior to encounter.     Current Outpatient Medications on File Prior to Encounter   Medication Sig    methylphenidate HCl 18 MG CR tablet Take 1 tablet (18 mg total) by mouth every morning.         Review of Systems:  Constitutional: negative for fevers  Eyes: negative visual changes  ENT: negative for hearing loss  Respiratory: negative for dyspnea  Cardiovascular: negative for chest pain  Gastrointestinal: negative for abdominal pain  Genitourinary: negative for dysuria  Neurological: negative for  headaches  Behavioral/Psych: negative for hallucinations  Endocrine: negative for temperature intolerance  MSK: left thumb laceration    Physical Exam:    Temp:  [99.2 °F (37.3 °C)] 99.2 °F (37.3 °C)  Pulse:  [77] 77  Resp:  [18] 18  SpO2:  [98 %] 98 %    Vitals: Afebrile.  Vital signs stable.  General: No acute distress.  HEENT: Normocephalic. Atraumatic. Sclera anicteric. No tracheal deviation.  Cardio: Regular rate.  Chest: No increased work of breathing.  Abdominal: Nondistended.  Extremities: No cyanosis.  No clubbing.    Skin: No generalized rash.  Neuro: Awake. Alert. Oriented to person, place, time, and situation.  Psych: Normal appearance. Cooperative.  Appropriate mood.  Appropriate affect.      MSK:  Right thumb exam  1cm laceration dorsal MPJ which does appear to be deep but is too small to explore for any lacerated deep structures. Bleeding controlled. Associated TTP at site of laceration and in particular as ulnar aspect of IPJ (over UCL). IPJ feels stable w both ulnar and radial deviation. Able to flex at IP & MPJ. Able to actively extend at MPJ but unable to actively extend at IPJ. Able to abduct and adduct thumb. SILT radial and ulnar aspects of thumb. [place] wwp, brisk cap refill    Otherwise normal exam right hand, wrist, upper extremity      Diagnostic Results: XR right hand w no evidence of fracture or dislocation     Assessment/Plan:  Lane Blancas is a 23 y.o. male with right dorsal thumb laceration over MPJ and inability to extend at IPJ concerning for EPL injury. Also w TTP at UCL concerning for possible UCL injury however no apparent instability w radial deviation of thumb. Bedside I&D and lac repair in ER. Placed in thumb spica splint w thumb in extension. NWB. Tetanus updated. Bactrim given in ER and prescribed for 7d for infection ppx. Will set up for eval in hand clinic.        Procedure Note: Right Thumb Laceration Repair  After time out was performed and patient ID, side, and  site were verified, the  was sterilly prepped in the standard fashion.  3 mL's of 1% lidocaine were injected around the site with a 25-gauge needle. After adequate analgesia was obtained, the wound was irrigated w saline w betadine. At this point, the wound was primarily closed using 4 - 0 Ethilon. The patient tolerated the procedure well with no complications.  Blood loss was minimal.      Ebonie Perry MD  Orthopedic Surgery Resident  04/25/2023

## 2023-04-25 NOTE — DISCHARGE INSTRUCTIONS
Splint care attached.  Take antibiotics as prescribed for 5 days.  Follow-up with orthopedic surgery.     Return to emergency department if he develops numbness, tingling, worsening swelling, paleness or any other concerning symptoms.

## 2023-04-25 NOTE — TELEPHONE ENCOUNTER
----- Message from Xuan Bañuelos sent at 4/25/2023  9:42 AM CDT -----  Regarding: sooner appt  Contact: ALYSE BLANCAS [[id number]]  Type:  Sooner Apoointment Request    Caller is requesting a sooner appointment.  Caller declined first available appointment listed below.  Caller will not accept being placed on the waitlist and is requesting a message be sent to doctor.  Name of Caller:Alyse Blancas    When is the first available appointment?5/2  Symptoms:right thumb cut, ER advised to be seen within 48hrs   Would the patient rather a call back or a [facility]? Call back   Best Call Back Number:Home Phone      [phone]  Work Phone      Not on file.  Mobile          [phone]  Mobile          [phone]      Additional Information:

## 2023-04-25 NOTE — PROGRESS NOTES
History & Physical  Orthopedics    SUBJECTIVE:      COVID-19 attestation:  This patient was treated during the COVID-19 pandemic.  This was discussed with the patient, they are aware of our current policies and procedures, were given the option of delaying their visit and or switching to a virtual visit, delaying their surgery when applicable, and they elect to proceed.    Chief Complaint: right thumb laceration    Referring Provider: Self, Aaareferral     History of Present Illness:  Lane Blancas is a 23 y.o. male RHD with PMHx significant for ADHD and anxiety who presents with right thumb laceration. Patients states right thumb was cut by a multitool . Since the injury has been unable to fully extend the thumb. Denies numbness, tingling. Denies any other injury.  Denies prior injuries or surgeries to the right hand. Seen in the ED and evaluated by on call resident.      The patient denies any fevers, chills, N/V, D/C and presents for evaluation.       Past Medical History:   Diagnosis Date    ADHD     diagnosed 7th grade    Anxiety     Seizures     2-5 years old     History reviewed. No pertinent surgical history.  Review of patient's allergies indicates:  No Known Allergies  Social History     Social History Narrative    Moved 6/2018 from [place], [place].  Mother a pediatric hospitalist.  Lives with mother, father, 1 cat; 3 siblings out of state in college ([employer], [place]); no smoking, smoke detectors (currently renting), no firearms     No family history on file.      Current Outpatient Medications:     methylphenidate HCl 18 MG CR tablet, Take 1 tablet (18 mg total) by mouth every morning., Disp: 30 tablet, Rfl: 0    sulfamethoxazole-trimethoprim 800-160mg (BACTRIM DS) 800-160 mg Tab, Take 1 tablet by mouth 2 (two) times daily. for 5 days, Disp: 10 tablet, Rfl: 0  No current facility-administered medications for this visit.      Review of Systems:  As per HPI otherwise  "noncontributory    OBJECTIVE:      Vital Signs (Most Recent):  Vitals:    04/25/23 1422   BP: 134/84   Pulse: 80   Weight: 59 kg (130 lb)   Height: 5' 4.77" (1.645 m)     Body mass index is 21.79 kg/m².      Physical Exam:  Constitutional: The patient appears well-developed and well-nourished. No distress.   Skin: No lesions appreciated  Head: Normocephalic and atraumatic.   Nose: Nose normal.   Ears: No deformities seen  Eyes: Conjunctivae and EOM are normal.   Neck: No tracheal deviation present.   Cardiovascular: Normal rate and intact distal pulses.    Pulmonary/Chest: Effort normal. No respiratory distress.   Abdominal: There is no guarding.   Neurological: The patient is alert.   Psychiatric: The patient has a normal mood and affect.     Right Hand/Wrist Examination:    Observation/Inspection:  Swelling  none    Deformity  none  Discoloration  none     Scars   Wound over dorsal thumb MCPJ    Atrophy  none    HAND/WRIST EXAMINATION:  Unable to extend thumb at IPJ  Mild laxity of UCL thumb    Neurovascular Exam:  Digits WWP, brisk CR < 3s throughout  NVI motor/LTS to M/R/U nerves, radial pulse 2+  Tinel's Test - Carpal Tunnel  Neg  Tinel's Test - Cubital Tunnel  Neg  Phalen's Test    Neg  Median Nerve Compression Test Neg    ROM hand full, painless    ROM wrist full, painless    ROM elbow full, painless    Abdomen not guarded  Respirations nonlabored  Perfusion intact    Diagnostic Results:     Imaging - I independently viewed the patient's imaging as well as the radiology report.  Xrays of the patient's right hand  demonstrate no evidence of any acute fractures or dislocations or significant degenerative changes.        ASSESSMENT/PLAN:      23 y.o. yo male with right thumb EPL laceration  Plan: The patient and I had a thorough discussion today.  We discussed the working diagnosis as well as several other potential alternative diagnoses.  Treatment options were discussed, both conservative and surgical.  " Conservative treatment options would include things such as activity modifications, workplace modifications, a period of rest, oral vs topical OTC and prescription anti-inflammatory medications, occupational therapy, splinting/bracing, immobilization, corticosteroid injections, and others.  Surgical options were discussed as well.     At this time, the patient would like to proceed with a trial of surcal repair of EPL, wound exploration and evaluation of UCL. Consented. F/u po. Will start on po doxy due to aquatic nature of injury.     Should the patient's symptoms worsen, persist, or fail to improve they should return for reevaluation and I would be happy to see them back anytime.

## 2023-04-25 NOTE — ED PROVIDER NOTES
Encounter Date: 4/24/2023       History     Chief Complaint   Patient presents with    Laceration     Right thumb lac, accidentally with a knife     Patient is a 23-year-old male history of anxiety, ADHD who presented to the emergency department for right thumb injury.  Patient reports that he was working with a small pocket knife utensil when the object clamped down and cut his finger.  Patient reports that there was a small laceration however he states that he is unable to extend his right thumb.  He denies any numbness or tingling and states that sensation is intact.  Patient denies any other injuries.    The history is provided by the patient.   Review of patient's allergies indicates:  No Known Allergies  Past Medical History:   Diagnosis Date    ADHD     diagnosed 7th grade    Anxiety     Seizures     2-5 years old     No past surgical history on file.  No family history on file.  Social History     Tobacco Use    Smoking status: Every Day     Types: Vaping with nicotine    Smokeless tobacco: Never   Substance Use Topics    Alcohol use: Yes     Alcohol/week: 12.0 standard drinks     Types: 12 Shots of liquor per week     Comment: 3x weekly    Drug use: Yes     Types: Marijuana     Review of Systems   Musculoskeletal:  Positive for arthralgias.   Skin:  Positive for wound.   Neurological:  Negative for syncope and headaches.     Physical Exam     Initial Vitals [04/24/23 2058]   BP Pulse Resp Temp SpO2   -- 77 18 99.2 °F (37.3 °C) 98 %      MAP       --         Physical Exam    Nursing note and vitals reviewed.  Constitutional: He appears well-developed and well-nourished.   HENT:   Head: Normocephalic and atraumatic.   Eyes: EOM are normal. Pupils are equal, round, and reactive to light.   Cardiovascular:  Normal rate and regular rhythm.           Pulmonary/Chest: No respiratory distress.   Abdominal: He exhibits no distension.   Musculoskeletal:      Right hand: Tenderness present. Decreased range of motion.  Normal sensation. Normal capillary refill.      Comments: Right thumb tenderness to palpation over D IP unable to extend DIP of the right thumb     Neurological: He is alert and oriented to person, place, and time. No sensory deficit.   Skin: Skin is warm and dry. Laceration noted.   A 1 cm laceration to the dorsal aspect of the right thumb       ED Course   Procedures  Labs Reviewed - No data to display       Imaging Results              X-Ray Hand 3 View Right (Final result)  Result time 04/24/23 21:36:17      Final result by Ben Tripathi MD (04/24/23 21:36:17)                   Impression:      No acute fracture.      Electronically signed by: Ben Tripathi MD  Date:    04/24/2023  Time:    21:36               Narrative:    EXAMINATION:  XR HAND COMPLETE 3 VIEW RIGHT    CLINICAL HISTORY:  right thumb injury;    TECHNIQUE:  PA, lateral, and oblique views of the right hand were performed.    COMPARISON:  None    FINDINGS:  No fracture or dislocation.  Unremarkable visualized bony structures.      Soft tissues are unremarkable.                                       Medications   LIDOcaine (PF) 10 mg/ml (1%) injection 50 mg (has no administration in time range)   Tdap (BOOSTRIX) vaccine injection 0.5 mL (0.5 mLs Intramuscular Given 4/24/23 2120)   sulfamethoxazole-trimethoprim 800-160mg per tablet 1 tablet (1 tablet Oral Given 4/24/23 2153)     Medical Decision Making:   Initial Assessment:   23-year-old male presenting to the emergency department for right hand injury.  At the time assessment patient is alert oriented in no acute distress.  Patient afebrile with vitals within normal limits.  Physical exam findings noted above.  Differential Diagnosis:   Laceration  Tendon tear  Finger Fracture  Clinical Tests:   Radiological Study: Ordered and Reviewed  ED Management:  23-year-old male presenting with right thumb injury after a an object knife clamped down and caused a laceration.  Upon examination patient  has sensation and vasculature intact with good cap refill however patient has difficulty with fully extending right thumb D IP.  Given laceration with knife, with last tetanus approximately 10 years ago tetanus booster given.  Patient also given oral Bactrim for concern of possible open fracture or open tender and wound.  X-ray of the right hand obtained with no signs of fracture.  Consulted Orthopedic surgery as patient is having difficulty with extension of the right thumb.  At this time patient pending Orthopedic evaluation and recommendations.  Orthopedic evaluated patient and believes it may be a band versus ligamentous injury.  Plan to washout wound with repair and a thumb spica in place with follow-up with hand surgery outpatient.  Orthopedic surgery will coordinate outpatient follow-up.  They also recommended a 5 day course of Bactrim.  Patient provided with a prescription for Bactrim with return precautions discussed and understood.  At this time patient is stable for discharge.  Patient agreeable with plan.                        Clinical Impression:   Final diagnoses:  [S61.011A] Laceration of right thumb without foreign body without damage to nail, initial encounter (Primary)  [S66.292S] Other specified injury of extensor muscle, fascia and tendon of left thumb at wrist and hand level, sequela        ED Disposition Condition    Discharge Stable          ED Prescriptions       Medication Sig Dispense Start Date End Date Auth. Provider    sulfamethoxazole-trimethoprim 800-160mg (BACTRIM DS) 800-160 mg Tab Take 1 tablet by mouth 2 (two) times daily. for 5 days 10 tablet 4/24/2023 4/29/2023 Lili Lezama MD          Follow-up Information       Follow up With Specialties Details Why Contact Info    [facility] - Emergency Dept Emergency Medicine   [address] [place] [id number] [phone]             Lili Lezama MD  Resident  04/24/23 7927

## 2023-04-26 ENCOUNTER — PATIENT MESSAGE (OUTPATIENT)
Dept: SURGERY | Facility: HOSPITAL | Age: 23
End: 2023-04-26
Payer: COMMERCIAL

## 2023-04-26 DIAGNOSIS — Z98.890 POST-OPERATIVE STATE: Primary | ICD-10-CM

## 2023-04-26 RX ORDER — DOXYCYCLINE HYCLATE 100 MG
100 TABLET ORAL EVERY 12 HOURS
Qty: 20 TABLET | Refills: 0 | Status: SHIPPED | OUTPATIENT
Start: 2023-04-26 | End: 2023-05-06

## 2023-04-26 RX ORDER — TRAMADOL HYDROCHLORIDE 50 MG/1
50 TABLET ORAL EVERY 6 HOURS PRN
Qty: 10 TABLET | Refills: 0 | Status: SHIPPED | OUTPATIENT
Start: 2023-04-26 | End: 2023-09-20

## 2023-04-27 ENCOUNTER — TELEPHONE (OUTPATIENT)
Dept: ORTHOPEDICS | Facility: CLINIC | Age: 23
End: 2023-04-27
Payer: COMMERCIAL

## 2023-04-27 ENCOUNTER — ANESTHESIA EVENT (OUTPATIENT)
Dept: SURGERY | Facility: HOSPITAL | Age: 23
End: 2023-04-27
Payer: COMMERCIAL

## 2023-04-27 DIAGNOSIS — S66.229A LACERATION OF EXTENSOR MUSCLE AND TENDON OF THUMB AT WRIST AND HAND LEVEL: Primary | ICD-10-CM

## 2023-04-27 RX ORDER — MUPIROCIN 20 MG/G
OINTMENT TOPICAL
Status: CANCELLED | OUTPATIENT
Start: 2023-04-27

## 2023-04-27 NOTE — TELEPHONE ENCOUNTER
Spoke c pt. Informed pt of 8:15 a.m. arrival time for 04/28/23 surgery at the [facility]. Reminded pt of NPO status & PO appt. Pt expressed understanding & was thankful.

## 2023-04-28 ENCOUNTER — HOSPITAL ENCOUNTER (OUTPATIENT)
Facility: HOSPITAL | Age: 23
Discharge: HOME | End: 2023-04-28
Attending: ORTHOPAEDIC SURGERY | Admitting: ORTHOPAEDIC SURGERY
Payer: COMMERCIAL

## 2023-04-28 ENCOUNTER — ANESTHESIA (OUTPATIENT)
Dept: SURGERY | Facility: HOSPITAL | Age: 23
End: 2023-04-28
Payer: COMMERCIAL

## 2023-04-28 DIAGNOSIS — S66.229A LACERATION OF EXTENSOR MUSCLE AND TENDON OF THUMB AT WRIST AND HAND LEVEL: ICD-10-CM

## 2023-04-28 PROCEDURE — 94761 N-INVAS EAR/PLS OXIMETRY MLT: CPT

## 2023-04-28 PROCEDURE — 25000003 PHARM REV CODE 250: Performed by: NURSE ANESTHETIST, CERTIFIED REGISTERED

## 2023-04-28 PROCEDURE — 63600175 PHARM REV CODE 636 W HCPCS: Performed by: STUDENT IN AN ORGANIZED HEALTH CARE EDUCATION/TRAINING PROGRAM

## 2023-04-28 PROCEDURE — 99900035 HC TECH TIME PER 15 MIN (STAT)

## 2023-04-28 PROCEDURE — D9220A PRA ANESTHESIA: Mod: CRNA,,, | Performed by: NURSE ANESTHETIST, CERTIFIED REGISTERED

## 2023-04-28 PROCEDURE — D9220A PRA ANESTHESIA: Mod: ANES,,, | Performed by: STUDENT IN AN ORGANIZED HEALTH CARE EDUCATION/TRAINING PROGRAM

## 2023-04-28 PROCEDURE — 37000009 HC ANESTHESIA EA ADD 15 MINS: Performed by: ORTHOPAEDIC SURGERY

## 2023-04-28 PROCEDURE — 71000033 HC RECOVERY, INTIAL HOUR: Performed by: ORTHOPAEDIC SURGERY

## 2023-04-28 PROCEDURE — 64415 SUPRACLAVICULAR SINGLE SHOT: ICD-10-PCS | Mod: 59,RT,, | Performed by: STUDENT IN AN ORGANIZED HEALTH CARE EDUCATION/TRAINING PROGRAM

## 2023-04-28 PROCEDURE — 25000003 PHARM REV CODE 250: Performed by: STUDENT IN AN ORGANIZED HEALTH CARE EDUCATION/TRAINING PROGRAM

## 2023-04-28 PROCEDURE — 26418 PR REPAIR EXTEN TENDON,DORSUM FINGR,EA: ICD-10-PCS | Mod: FA,,, | Performed by: ORTHOPAEDIC SURGERY

## 2023-04-28 PROCEDURE — 37000008 HC ANESTHESIA 1ST 15 MINUTES: Performed by: ORTHOPAEDIC SURGERY

## 2023-04-28 PROCEDURE — 36000707: Performed by: ORTHOPAEDIC SURGERY

## 2023-04-28 PROCEDURE — 27200750 HC INSULATED NEEDLE/ STIMUPLEX: Performed by: STUDENT IN AN ORGANIZED HEALTH CARE EDUCATION/TRAINING PROGRAM

## 2023-04-28 PROCEDURE — D9220A PRA ANESTHESIA: ICD-10-PCS | Mod: CRNA,,, | Performed by: NURSE ANESTHETIST, CERTIFIED REGISTERED

## 2023-04-28 PROCEDURE — 36000706: Performed by: ORTHOPAEDIC SURGERY

## 2023-04-28 PROCEDURE — 27201423 OPTIME MED/SURG SUP & DEVICES STERILE SUPPLY: Performed by: ORTHOPAEDIC SURGERY

## 2023-04-28 PROCEDURE — 71000015 HC POSTOP RECOV 1ST HR: Performed by: ORTHOPAEDIC SURGERY

## 2023-04-28 PROCEDURE — 25000003 PHARM REV CODE 250: Performed by: ORTHOPAEDIC SURGERY

## 2023-04-28 PROCEDURE — 63600175 PHARM REV CODE 636 W HCPCS: Performed by: NURSE ANESTHETIST, CERTIFIED REGISTERED

## 2023-04-28 PROCEDURE — 26418 REPAIR FINGER TENDON: CPT | Mod: FA,,, | Performed by: ORTHOPAEDIC SURGERY

## 2023-04-28 PROCEDURE — 64415 NJX AA&/STRD BRCH PLXS IMG: CPT | Performed by: STUDENT IN AN ORGANIZED HEALTH CARE EDUCATION/TRAINING PROGRAM

## 2023-04-28 PROCEDURE — D9220A PRA ANESTHESIA: ICD-10-PCS | Mod: ANES,,, | Performed by: STUDENT IN AN ORGANIZED HEALTH CARE EDUCATION/TRAINING PROGRAM

## 2023-04-28 RX ORDER — PROPOFOL 10 MG/ML
VIAL (ML) INTRAVENOUS CONTINUOUS PRN
Status: DISCONTINUED | OUTPATIENT
Start: 2023-04-28 | End: 2023-04-28

## 2023-04-28 RX ORDER — DEXAMETHASONE SODIUM PHOSPHATE 4 MG/ML
INJECTION, SOLUTION INTRA-ARTICULAR; INTRALESIONAL; INTRAMUSCULAR; INTRAVENOUS; SOFT TISSUE
Status: DISCONTINUED | OUTPATIENT
Start: 2023-04-28 | End: 2023-04-28

## 2023-04-28 RX ORDER — HYDROMORPHONE HYDROCHLORIDE 1 MG/ML
0.2 INJECTION, SOLUTION INTRAMUSCULAR; INTRAVENOUS; SUBCUTANEOUS EVERY 5 MIN PRN
Status: DISCONTINUED | OUTPATIENT
Start: 2023-04-28 | End: 2023-04-28 | Stop reason: HOSPADM

## 2023-04-28 RX ORDER — PROPOFOL 10 MG/ML
VIAL (ML) INTRAVENOUS
Status: DISCONTINUED | OUTPATIENT
Start: 2023-04-28 | End: 2023-04-28

## 2023-04-28 RX ORDER — FENTANYL CITRATE 50 UG/ML
25 INJECTION, SOLUTION INTRAMUSCULAR; INTRAVENOUS EVERY 5 MIN PRN
Status: DISCONTINUED | OUTPATIENT
Start: 2023-04-28 | End: 2023-04-28 | Stop reason: HOSPADM

## 2023-04-28 RX ORDER — BACITRACIN ZINC 500 UNIT/G
OINTMENT (GRAM) TOPICAL
Status: DISCONTINUED | OUTPATIENT
Start: 2023-04-28 | End: 2023-04-28 | Stop reason: HOSPADM

## 2023-04-28 RX ORDER — ONDANSETRON 2 MG/ML
INJECTION INTRAMUSCULAR; INTRAVENOUS
Status: DISCONTINUED | OUTPATIENT
Start: 2023-04-28 | End: 2023-04-28

## 2023-04-28 RX ORDER — OXYCODONE HYDROCHLORIDE 5 MG/1
5 TABLET ORAL
Status: DISCONTINUED | OUTPATIENT
Start: 2023-04-28 | End: 2023-04-28 | Stop reason: HOSPADM

## 2023-04-28 RX ORDER — MUPIROCIN 20 MG/G
OINTMENT TOPICAL
Status: DISCONTINUED | OUTPATIENT
Start: 2023-04-28 | End: 2023-04-28 | Stop reason: HOSPADM

## 2023-04-28 RX ORDER — LIDOCAINE HYDROCHLORIDE 20 MG/ML
INJECTION INTRAVENOUS
Status: DISCONTINUED | OUTPATIENT
Start: 2023-04-28 | End: 2023-04-28

## 2023-04-28 RX ORDER — ACETAMINOPHEN 500 MG
1000 TABLET ORAL
Status: COMPLETED | OUTPATIENT
Start: 2023-04-28 | End: 2023-04-28

## 2023-04-28 RX ORDER — CARBOXYMETHYLCELLULOSE SODIUM 10 MG/ML
GEL OPHTHALMIC
Status: DISCONTINUED | OUTPATIENT
Start: 2023-04-28 | End: 2023-04-28

## 2023-04-28 RX ORDER — SODIUM CHLORIDE 0.9 % (FLUSH) 0.9 %
10 SYRINGE (ML) INJECTION
Status: DISCONTINUED | OUTPATIENT
Start: 2023-04-28 | End: 2023-04-28 | Stop reason: HOSPADM

## 2023-04-28 RX ORDER — HALOPERIDOL 5 MG/ML
0.5 INJECTION INTRAMUSCULAR EVERY 10 MIN PRN
Status: DISCONTINUED | OUTPATIENT
Start: 2023-04-28 | End: 2023-04-28 | Stop reason: HOSPADM

## 2023-04-28 RX ORDER — ROPIVACAINE HYDROCHLORIDE 5 MG/ML
INJECTION, SOLUTION EPIDURAL; INFILTRATION; PERINEURAL
Status: COMPLETED | OUTPATIENT
Start: 2023-04-28 | End: 2023-04-28

## 2023-04-28 RX ORDER — FENTANYL CITRATE 50 UG/ML
25-200 INJECTION, SOLUTION INTRAMUSCULAR; INTRAVENOUS
Status: DISCONTINUED | OUTPATIENT
Start: 2023-04-28 | End: 2023-04-28 | Stop reason: HOSPADM

## 2023-04-28 RX ORDER — MIDAZOLAM HYDROCHLORIDE 1 MG/ML
INJECTION, SOLUTION INTRAMUSCULAR; INTRAVENOUS
Status: DISCONTINUED | OUTPATIENT
Start: 2023-04-28 | End: 2023-04-28

## 2023-04-28 RX ORDER — ONDANSETRON 2 MG/ML
4 INJECTION INTRAMUSCULAR; INTRAVENOUS DAILY PRN
Status: DISCONTINUED | OUTPATIENT
Start: 2023-04-28 | End: 2023-04-28 | Stop reason: HOSPADM

## 2023-04-28 RX ORDER — MIDAZOLAM HYDROCHLORIDE 1 MG/ML
.5-4 INJECTION INTRAMUSCULAR; INTRAVENOUS
Status: DISCONTINUED | OUTPATIENT
Start: 2023-04-28 | End: 2023-04-28 | Stop reason: HOSPADM

## 2023-04-28 RX ADMIN — LIDOCAINE HYDROCHLORIDE 75 MG: 20 INJECTION INTRAVENOUS at 11:04

## 2023-04-28 RX ADMIN — ACETAMINOPHEN 1000 MG: 500 TABLET ORAL at 08:04

## 2023-04-28 RX ADMIN — ONDANSETRON 4 MG: 2 INJECTION, SOLUTION INTRAMUSCULAR; INTRAVENOUS at 11:04

## 2023-04-28 RX ADMIN — DEXAMETHASONE SODIUM PHOSPHATE 4 MG: 4 INJECTION, SOLUTION INTRAMUSCULAR; INTRAVENOUS at 11:04

## 2023-04-28 RX ADMIN — CEFAZOLIN 2 G: 2 INJECTION, POWDER, FOR SOLUTION INTRAMUSCULAR; INTRAVENOUS at 11:04

## 2023-04-28 RX ADMIN — MIDAZOLAM HYDROCHLORIDE 2 MG: 1 INJECTION, SOLUTION INTRAMUSCULAR; INTRAVENOUS at 09:04

## 2023-04-28 RX ADMIN — MIDAZOLAM HYDROCHLORIDE 2 MG: 1 INJECTION, SOLUTION INTRAMUSCULAR; INTRAVENOUS at 10:04

## 2023-04-28 RX ADMIN — PROPOFOL 30 MG: 10 INJECTION, EMULSION INTRAVENOUS at 11:04

## 2023-04-28 RX ADMIN — MUPIROCIN: 20 OINTMENT TOPICAL at 08:04

## 2023-04-28 RX ADMIN — ROPIVACAINE HYDROCHLORIDE 30 ML: 5 INJECTION EPIDURAL; INFILTRATION; PERINEURAL at 09:04

## 2023-04-28 RX ADMIN — CARBOXYMETHYLCELLULOSE SODIUM 4 DROP: 10 GEL OPHTHALMIC at 11:04

## 2023-04-28 RX ADMIN — FENTANYL CITRATE 100 MCG: 50 INJECTION INTRAMUSCULAR; INTRAVENOUS at 09:04

## 2023-04-28 RX ADMIN — PROPOFOL 100 MCG/KG/MIN: 10 INJECTION, EMULSION INTRAVENOUS at 11:04

## 2023-04-28 RX ADMIN — SODIUM CHLORIDE: 9 INJECTION, SOLUTION INTRAVENOUS at 10:04

## 2023-04-28 NOTE — PLAN OF CARE
Pre op complete. Waiting on anesthesia consent, site alice and update. Pt's mother at bedside; pt belongings in locker. Pt resting comfortably with all questions addressed and call light in reach.

## 2023-04-28 NOTE — PROGRESS NOTES
I have personally taken the history and examined this patient. I agree with the resident's note as stated above.        23 y.o. yo male with right thumb EPL laceration  Plan: The patient and I had a thorough discussion today.  We discussed the working diagnosis as well as several other potential alternative diagnoses.  Treatment options were discussed, both conservative and surgical.  Conservative treatment options would include things such as activity modifications, workplace modifications, a period of rest, oral vs topical OTC and prescription anti-inflammatory medications, occupational therapy, splinting/bracing, immobilization, corticosteroid injections, and others.  Surgical options were discussed as well.      At this time, the patient would like to proceed with a trial of surcal repair of EPL, wound exploration and evaluation of UCL. Consented. F/u po. Will start on po doxy due to aquatic nature of injury.

## 2023-04-28 NOTE — ANESTHESIA PROCEDURE NOTES
Supraclavicular single shot    Patient location during procedure: pre-op   Block not for primary anesthetic.  Reason for block: at surgeon's request and post-op pain management   Post-op Pain Location: right upper extremity   Start time: 4/28/2023 9:36 AM  Timeout: 4/28/2023 9:35 AM   End time: 4/28/2023 9:40 AM    Staffing  Authorizing Provider: Fred Saul MD  Performing Provider: Fred Saul MD    Preanesthetic Checklist  Completed: patient identified, IV checked, site marked, risks and benefits discussed, surgical consent, monitors and equipment checked, pre-op evaluation and timeout performed  Peripheral Block  Patient position: supine  Prep: ChloraPrep  Patient monitoring: heart rate, cardiac monitor, continuous pulse ox, continuous capnometry and frequent blood pressure checks  Block type: supraclavicular  Laterality: right  Injection technique: single shot  Needle  Needle type: Stimuplex   Needle gauge: 22 G  Needle length: 2 in  Needle localization: anatomical landmarks and ultrasound guidance   -ultrasound image captured on disc.  Assessment  Injection assessment: negative aspiration, negative parasthesia and local visualized surrounding nerve  Paresthesia pain: none  Heart rate change: no  Slow fractionated injection: yes  Pain Tolerance: comfortable throughout block and no complaints  Medications:    Medications: ropivacaine (NAROPIN) injection 0.5% - Perineural   30 mL - 4/28/2023 9:38:00 AM    Additional Notes  VSS.  [facility] RN monitoring vitals throughout procedure.  Patient tolerated procedure well.

## 2023-04-28 NOTE — OP NOTE
[facility] ([facility])  Surgery Department  Operative Note    SUMMARY     Date of Procedure: 4/28/2023     Procedure: Procedure(s) (LRB):  EXPLORATION, WOUND (Right)  REPAIR, TENDON, EXTENSOR (Right)     Surgeon(s) and Role:     * Khadra Jasso MD - Primary    Assisting Surgeon: None    Pre-Operative Diagnosis: Laceration of extensor muscle and tendon of thumb at wrist and hand level [S66.229A]    Post-Operative Diagnosis: Post-Op Diagnosis Codes:     * Laceration of extensor muscle and tendon of thumb at wrist and hand level [S66.229A]    Anesthesia: Regional    Technical Procedures Used: surgery    Description of the Findings of the Procedure:  Indication for procedure Lane is a 23-year-old male that sustained a laceration to the dorsum of his right thumb he was unable to extend his right thumb on evaluation after much discussion we elected for surgical intervention for exploration possible extensor tendon repair risks and benefits were explained in detail consents were signed in clinic    Procedure in detail the correct site was marked with the patient's participation in the holding area the patient underwent regional anesthesia was brought to the operating placed in supine position underwent MAC anesthesia a well-padded nonsterile tourniquet was placed on the right upper extremity the right upper extremity was prepped and draped normal sterile fashion time-out was conducted with the correct site procedure be indicated IV antibiotics again with the patient preoperatively the original wound was extended performing almost a Z-type incision skin flaps were elevated immediately we could see the ends of the tendon which was completely lacerated this was the extensor pollicis the ends were debrided the areas irrigated copious amounts of normal saline this is a flat edge of the tendon and this was repaired using Prolene suture good extension was achieved patient was placed in flexion and extension showed  that the thumb moved naturally the areas irrigated with copious amounts of normal saline nylon closed the skin sterile dressing was applied patient was placed in a well-padded thumb splint in extension tolerated procedure well    Postop plans patient to keep the dressing clean dry and intact 2 weeks' time sutures out but he will remain immobilized for 2 weeks prior to therapy which will start in 4-6    Significant Surgical Tasks Conducted by the Assistant(s), if Applicable: retraction    Complications: No    Estimated Blood Loss (EBL): * No values recorded between 4/28/2023 11:20 AM and 4/28/2023 11:48 AM *           Implants: * No implants in log *    Specimens:   Specimen (24h ago, onward)      None                    Condition: Good    Disposition: PACU - hemodynamically stable.    Attestation: I performed the procedure.    Discharge Note    SUMMARY     Admit Date: 4/28/2023    Discharge Date and Time: No discharge date for patient encounter.    Hospital Course (synopsis of major diagnoses, care, treatment, and services provided during the course of the hospital stay): surgery     Final Diagnosis: Post-Op Diagnosis Codes:     * Laceration of extensor muscle and tendon of thumb at wrist and hand level [S66.229A]    Disposition: Home or Self Care    Follow Up/Patient Instructions:     Medications:  Reconciled Home Medications:      Medication List        CONTINUE taking these medications      doxycycline 100 MG tablet  Commonly known as: VIBRA-TABS  Take 1 tablet (100 mg total) by mouth every 12 (twelve) hours. for 10 days     methylphenidate HCl 18 MG CR tablet  Take 1 tablet (18 mg total) by mouth every morning.     sulfamethoxazole-trimethoprim 800-160mg 800-160 mg Tab  Commonly known as: BACTRIM DS  Take 1 tablet by mouth 2 (two) times daily. for 5 days     traMADoL 50 mg tablet  Commonly known as: ULTRAM  Take 1 tablet (50 mg total) by mouth every 6 (six) hours as needed for Pain.            Discharge  Procedure Orders   Diet general     Call MD for:  temperature >100.4     Call MD for:  persistent nausea and vomiting     Call MD for:  severe uncontrolled pain     Call MD for:  difficulty breathing, headache or visual disturbances     Call MD for:  redness, tenderness, or signs of infection (pain, swelling, redness, odor or green/yellow discharge around incision site)     Call MD for:  hives     Call MD for:  persistent dizziness or light-headedness     Call MD for:  extreme fatigue     Keep surgical extremity elevated     Leave dressing on - Keep it clean, dry, and intact until clinic visit

## 2023-04-28 NOTE — TRANSFER OF CARE
"Anesthesia Transfer of Care Note    Patient: Lnae Blancas    Procedure(s) Performed: Procedure(s) (LRB):  EXPLORATION, WOUND (Right)  REPAIR, TENDON, EXTENSOR (Right)    Patient location: PACU    Anesthesia Type: general    Transport from OR: Transported from OR on room air with adequate spontaneous ventilation    Post pain: adequate analgesia    Post assessment: no apparent anesthetic complications and tolerated procedure well    Post vital signs: stable    Level of consciousness: awake and alert    Nausea/Vomiting: no nausea/vomiting    Complications: none    Transfer of care protocol was followed      Last vitals:   Visit Vitals  /75 (BP Location: Left arm, Patient Position: Lying)   Pulse 68   Temp 37.2 °C (98.9 °F) (Temporal)   Resp 16   Ht 5' 4" (1.626 m)   Wt 59 kg (130 lb)   SpO2 98%   BMI 22.31 kg/m²     "

## 2023-04-28 NOTE — ANESTHESIA POSTPROCEDURE EVALUATION
Anesthesia Post Evaluation    Patient: Lane Blancas    Procedure(s) Performed: Procedure(s) (LRB):  EXPLORATION, WOUND (Right)  REPAIR, TENDON, EXTENSOR (Right)    Final Anesthesia Type: general      Patient location during evaluation: PACU  Patient participation: Yes- Able to Participate  Level of consciousness: awake and alert  Post-procedure vital signs: reviewed and stable  Pain management: adequate  Airway patency: patent  VERONICA mitigation strategies: Multimodal analgesia  PONV status at discharge: No PONV  Anesthetic complications: no      Cardiovascular status: blood pressure returned to baseline and hemodynamically stable  Respiratory status: unassisted  Hydration status: euvolemic  Follow-up not needed.          Vitals Value Taken Time   /51 04/28/23 1155   Temp 37 04/28/23 1158   Pulse 68 04/28/23 1157   Resp 19 04/28/23 1157   SpO2 99 % 04/28/23 1157   Vitals shown include unvalidated device data.      No case tracking events are documented in the log.      Pain/Liu Score: Pain Rating Prior to Med Admin: 4 (4/28/2023  9:35 AM)  Liu Score: 10 (4/28/2023 11:50 AM)

## 2023-04-28 NOTE — ANESTHESIA PREPROCEDURE EVALUATION
04/28/2023  Pre-operative evaluation for Procedure(s) (LRB):  EXPLORATION, WOUND (Right)  REPAIR, TENDON, EXTENSOR (Right)    Lane Blancas is a 23 y.o. male     Patient Active Problem List   Diagnosis    Attention deficit hyperactivity disorder (ADHD), predominantly inattentive type    Anxiety       Review of patient's allergies indicates:  No Known Allergies    No current facility-administered medications on file prior to encounter.     Current Outpatient Medications on File Prior to Encounter   Medication Sig Dispense Refill    methylphenidate HCl 18 MG CR tablet Take 1 tablet (18 mg total) by mouth every morning. 30 tablet 0    doxycycline (VIBRA-TABS) 100 MG tablet Take 1 tablet (100 mg total) by mouth every 12 (twelve) hours. for 10 days 20 tablet 0    sulfamethoxazole-trimethoprim 800-160mg (BACTRIM DS) 800-160 mg Tab Take 1 tablet by mouth 2 (two) times daily. for 5 days 10 tablet 0       History reviewed. No pertinent surgical history.    Social History     Socioeconomic History    Marital status: Single   Tobacco Use    Smoking status: Every Day     Types: Vaping with nicotine    Smokeless tobacco: Never   Substance and Sexual Activity    Alcohol use: Yes     Alcohol/week: 12.0 standard drinks     Types: 12 Shots of liquor per week     Comment: 3x weekly    Drug use: Yes     Types: Marijuana    Sexual activity: Yes     Partners: Female     Birth control/protection: Condom   Social History Narrative    Moved 6/2018 from [place], [place].  Mother a pediatric hospitalist.  Lives with mother, father, 1 cat; 3 siblings out of state in college ([employer], [place]); no smoking, smoke detectors (currently renting), no firearms         CBC: No results for input(s): WBC, RBC, HGB, HCT, PLT, MCV, MCH, MCHC in the last 72 hours.    CMP: No results for input(s): NA, K, CL, CO2,  BUN, CREATININE, GLU, MG, PHOS, CALCIUM, ALBUMIN, PROT, ALKPHOS, ALT, AST, BILITOT in the last 72 hours.    INR  No results for input(s): PT, INR, PROTIME, APTT in the last 72 hours.        Diagnostic Studies:      EKD Echo:  No results found for this or any previous visit.      Pre-op Assessment    I have reviewed the Patient Summary Reports.     I have reviewed the Nursing Notes. I have reviewed the NPO Status.   I have reviewed the Medications.     Review of Systems      Physical Exam  General: Well nourished and Cooperative    Airway:  Mallampati: II   Mouth Opening: Normal  TM Distance: Normal  Tongue: Normal  Neck ROM: Normal ROM    Chest/Lungs:  Clear to auscultation, Normal Respiratory Rate    Heart:  Rate: Normal  Rhythm: Regular Rhythm  Sounds: Normal        Anesthesia Plan  Type of Anesthesia, risks & benefits discussed:    Anesthesia Type: Gen Natural Airway  Intra-op Monitoring Plan: Standard ASA Monitors  Post Op Pain Control Plan: multimodal analgesia and IV/PO Opioids PRN  Induction:  IV  Airway Plan: Direct and Video, Post-Induction  Informed Consent: Informed consent signed with the Patient and all parties understand the risks and agree with anesthesia plan.  All questions answered.   ASA Score: 1    Ready For Surgery From Anesthesia Perspective.     .

## 2023-04-28 NOTE — DISCHARGE SUMMARY
[place] - Surgery (Hospital)  Discharge Note  Short Stay    Procedure(s) (LRB):  EXPLORATION, WOUND (Right)  REPAIR, TENDON, EXTENSOR (Right)      OUTCOME: Patient tolerated treatment/procedure well without complication and is now ready for discharge.    DISPOSITION: Home or Self Care    FINAL DIAGNOSIS:  <principal problem not specified>    FOLLOWUP: In clinic    DISCHARGE INSTRUCTIONS:    Discharge Procedure Orders   Diet general     Call MD for:  temperature >100.4     Call MD for:  persistent nausea and vomiting     Call MD for:  severe uncontrolled pain     Call MD for:  difficulty breathing, headache or visual disturbances     Call MD for:  redness, tenderness, or signs of infection (pain, swelling, redness, odor or green/yellow discharge around incision site)     Call MD for:  hives     Call MD for:  persistent dizziness or light-headedness     Call MD for:  extreme fatigue     Keep surgical extremity elevated     Leave dressing on - Keep it clean, dry, and intact until clinic visit        TIME SPENT ON DISCHARGE: 10 minutes

## 2023-05-01 VITALS
OXYGEN SATURATION: 98 % | TEMPERATURE: 98 F | SYSTOLIC BLOOD PRESSURE: 125 MMHG | HEART RATE: 61 BPM | WEIGHT: 130 LBS | DIASTOLIC BLOOD PRESSURE: 64 MMHG | BODY MASS INDEX: 22.2 KG/M2 | RESPIRATION RATE: 20 BRPM | HEIGHT: 64 IN

## 2023-05-11 ENCOUNTER — TELEPHONE (OUTPATIENT)
Dept: ORTHOPEDICS | Facility: CLINIC | Age: 23
End: 2023-05-11
Payer: COMMERCIAL

## 2023-05-12 ENCOUNTER — OFFICE VISIT (OUTPATIENT)
Dept: ORTHOPEDICS | Facility: CLINIC | Age: 23
End: 2023-05-12
Payer: COMMERCIAL

## 2023-05-12 VITALS — HEIGHT: 64 IN | WEIGHT: 130 LBS | BODY MASS INDEX: 22.2 KG/M2

## 2023-05-12 DIAGNOSIS — Z98.890 POST-OPERATIVE STATE: Primary | ICD-10-CM

## 2023-05-12 PROCEDURE — 3008F PR BODY MASS INDEX (BMI) DOCUMENTED: ICD-10-PCS | Mod: CPTII,S$GLB,, | Performed by: PHYSICIAN ASSISTANT

## 2023-05-12 PROCEDURE — 1159F MED LIST DOCD IN RCRD: CPT | Mod: CPTII,S$GLB,, | Performed by: PHYSICIAN ASSISTANT

## 2023-05-12 PROCEDURE — 3008F BODY MASS INDEX DOCD: CPT | Mod: CPTII,S$GLB,, | Performed by: PHYSICIAN ASSISTANT

## 2023-05-12 PROCEDURE — 99024 POSTOP FOLLOW-UP VISIT: CPT | Mod: S$GLB,,, | Performed by: PHYSICIAN ASSISTANT

## 2023-05-12 PROCEDURE — 99999 PR PBB SHADOW E&M-EST. PATIENT-LVL III: CPT | Mod: PBBFAC,,, | Performed by: PHYSICIAN ASSISTANT

## 2023-05-12 PROCEDURE — 99024 PR POST-OP FOLLOW-UP VISIT: ICD-10-PCS | Mod: S$GLB,,, | Performed by: PHYSICIAN ASSISTANT

## 2023-05-12 PROCEDURE — 29075 PR APPLY FOREARM CAST: ICD-10-PCS | Mod: 58,RT,S$GLB, | Performed by: PHYSICIAN ASSISTANT

## 2023-05-12 PROCEDURE — 99999 PR PBB SHADOW E&M-EST. PATIENT-LVL III: ICD-10-PCS | Mod: PBBFAC,,, | Performed by: PHYSICIAN ASSISTANT

## 2023-05-12 PROCEDURE — 1159F PR MEDICATION LIST DOCUMENTED IN MEDICAL RECORD: ICD-10-PCS | Mod: CPTII,S$GLB,, | Performed by: PHYSICIAN ASSISTANT

## 2023-05-12 PROCEDURE — 29075 APPL CST ELBW FNGR SHORT ARM: CPT | Mod: 58,RT,S$GLB, | Performed by: PHYSICIAN ASSISTANT

## 2023-05-12 NOTE — PROGRESS NOTES
"Mr. Blancas is here today for a post-operative visit.  He is 14 days status post right hand wound exploration with right extensor pollicis tendon repair by Dr. Jasso on 4/28/23. He reports that he is doing well. Pain is minimal. He denies fever, chills, and sweats since the time of the surgery.     Physical exam:    Vitals:    05/12/23 1545   Weight: 59 kg (130 lb)   Height: 5' 4" (1.626 m)   PainSc: 0-No pain     Vital signs are stable, patient is afebrile.  Patient is well dressed and well groomed, no acute distress.  Alert and oriented to person, place, and time.  Post op dressing taken down.  Incision is clean, dry and intact.  There is no erythema or exudate.  There is no sign of any infection. He is NVI. Sutures removed without difficulty.  Good motion digits 2-5. Good thumb extension.    Assessment:  status post right hand wound exploration with right extensor pollicis tendon repair by Dr. Jasso on 4/28/23    Plan:  Lane was seen today for post-op evaluation.    Diagnoses and all orders for this visit:    Post-operative state  -     Ambulatory referral/consult to Physical/Occupational Therapy; Future      PO instruction reviewed and provided to patient  Right thumb spica fiberglass cast applied with thumb in extension   Therapy ordered to begin in 2 wks, can make custom orthosis   RTC 2 wks plan for cast removal, begin therapy with custom orthosis       "

## 2023-05-15 ENCOUNTER — PATIENT MESSAGE (OUTPATIENT)
Dept: ORTHOPEDICS | Facility: CLINIC | Age: 23
End: 2023-05-15
Payer: COMMERCIAL

## 2023-05-17 ENCOUNTER — PATIENT MESSAGE (OUTPATIENT)
Dept: ORTHOPEDICS | Facility: CLINIC | Age: 23
End: 2023-05-17
Payer: COMMERCIAL

## 2023-05-18 ENCOUNTER — OFFICE VISIT (OUTPATIENT)
Dept: ORTHOPEDICS | Facility: CLINIC | Age: 23
End: 2023-05-18
Payer: COMMERCIAL

## 2023-05-18 DIAGNOSIS — Z47.89 ENCOUNTER FOR CAST CHANGE: Primary | ICD-10-CM

## 2023-05-18 PROCEDURE — 99024 PR POST-OP FOLLOW-UP VISIT: ICD-10-PCS | Mod: S$GLB,,, | Performed by: PHYSICIAN ASSISTANT

## 2023-05-18 PROCEDURE — 29075 PR APPLY FOREARM CAST: ICD-10-PCS | Mod: 58,RT,S$GLB, | Performed by: PHYSICIAN ASSISTANT

## 2023-05-18 PROCEDURE — 99024 POSTOP FOLLOW-UP VISIT: CPT | Mod: S$GLB,,, | Performed by: PHYSICIAN ASSISTANT

## 2023-05-18 PROCEDURE — 29075 APPL CST ELBW FNGR SHORT ARM: CPT | Mod: 58,RT,S$GLB, | Performed by: PHYSICIAN ASSISTANT

## 2023-05-18 NOTE — PROGRESS NOTES
Pt presents for cast change due to padding/ stokinette coming loose  New right fiberglass thumb spica cast applied with thumb in extension  RTC for regular post op

## 2023-05-25 PROBLEM — F17.200 VAPING NICOTINE DEPENDENCE, NON-TOBACCO PRODUCT: Status: ACTIVE | Noted: 2023-05-25

## 2023-05-26 ENCOUNTER — PATIENT MESSAGE (OUTPATIENT)
Dept: ORTHOPEDICS | Facility: CLINIC | Age: 23
End: 2023-05-26
Payer: COMMERCIAL

## 2023-06-01 ENCOUNTER — PATIENT OUTREACH (OUTPATIENT)
Dept: ADMINISTRATIVE | Facility: HOSPITAL | Age: 23
End: 2023-06-01
Payer: COMMERCIAL

## 2023-06-01 ENCOUNTER — OFFICE VISIT (OUTPATIENT)
Dept: ORTHOPEDICS | Facility: CLINIC | Age: 23
End: 2023-06-01
Payer: COMMERCIAL

## 2023-06-01 VITALS — BODY MASS INDEX: 22.2 KG/M2 | HEIGHT: 64 IN | WEIGHT: 130 LBS

## 2023-06-01 DIAGNOSIS — Z98.890 POST-OPERATIVE STATE: Primary | ICD-10-CM

## 2023-06-01 PROCEDURE — 3008F BODY MASS INDEX DOCD: CPT | Mod: CPTII,S$GLB,, | Performed by: ORTHOPAEDIC SURGERY

## 2023-06-01 PROCEDURE — 99999 PR PBB SHADOW E&M-EST. PATIENT-LVL II: ICD-10-PCS | Mod: PBBFAC,,, | Performed by: ORTHOPAEDIC SURGERY

## 2023-06-01 PROCEDURE — 3008F PR BODY MASS INDEX (BMI) DOCUMENTED: ICD-10-PCS | Mod: CPTII,S$GLB,, | Performed by: ORTHOPAEDIC SURGERY

## 2023-06-01 PROCEDURE — 99024 PR POST-OP FOLLOW-UP VISIT: ICD-10-PCS | Mod: S$GLB,,, | Performed by: ORTHOPAEDIC SURGERY

## 2023-06-01 PROCEDURE — 1159F PR MEDICATION LIST DOCUMENTED IN MEDICAL RECORD: ICD-10-PCS | Mod: CPTII,S$GLB,, | Performed by: ORTHOPAEDIC SURGERY

## 2023-06-01 PROCEDURE — 99999 PR PBB SHADOW E&M-EST. PATIENT-LVL II: CPT | Mod: PBBFAC,,, | Performed by: ORTHOPAEDIC SURGERY

## 2023-06-01 PROCEDURE — 1159F MED LIST DOCD IN RCRD: CPT | Mod: CPTII,S$GLB,, | Performed by: ORTHOPAEDIC SURGERY

## 2023-06-01 PROCEDURE — 99024 POSTOP FOLLOW-UP VISIT: CPT | Mod: S$GLB,,, | Performed by: ORTHOPAEDIC SURGERY

## 2023-06-01 NOTE — PROGRESS NOTES
Patient is 6 weeks out from right thumb extensor tendon repair been immobilized he is doing well he states he has little changes of feeling of this thumb tip most likely from the immobilization    On examination the wound is well healed he is able to extend his thumb    Patient has therapy scheduled for tomorrow will brace him today he will start therapy tomorrow follow up in 6 weeks

## 2023-06-12 ENCOUNTER — CLINICAL SUPPORT (OUTPATIENT)
Dept: REHABILITATION | Facility: HOSPITAL | Age: 23
End: 2023-06-12
Payer: COMMERCIAL

## 2023-06-12 DIAGNOSIS — M79.644 PAIN OF RIGHT THUMB: ICD-10-CM

## 2023-06-12 DIAGNOSIS — M25.641 STIFFNESS OF RIGHT HAND, NOT ELSEWHERE CLASSIFIED: ICD-10-CM

## 2023-06-12 DIAGNOSIS — Z98.890 POST-OPERATIVE STATE: ICD-10-CM

## 2023-06-12 PROCEDURE — 97165 OT EVAL LOW COMPLEX 30 MIN: CPT

## 2023-06-12 PROCEDURE — 97110 THERAPEUTIC EXERCISES: CPT

## 2023-06-12 NOTE — PLAN OF CARE
[facility]  Occupational Therapy Initial Evaluation     Name: Lane Blancas  Clinic Number: [id number]    Therapy Diagnosis:   Encounter Diagnoses   Name Primary?    Post-operative state     Stiffness of right hand, not elsewhere classified     Pain of right thumb      Physician: Sheri Ahumada PA-C    Physician Orders: Eval and Treat  Medical Diagnosis: Z98.890 (ICD-10-CM) - Post-operative state  Surgical Procedure and Date: R thumb extensor tendon repair, 4/28/23 / Date of Injury: 4/21/23  Evaluation Date: 6/12/2023  Insurance Authorization Period Expiration: 12/31/23  Plan of Care Certification Period: 9/8/23  Date of Return to MD: TERRIE  Visit # / Visits authorized: 1 / 1  FOTO: 1/3    Precautions:  Standard    Time In: 12:30 pm  Time Out: 1:10 pm  Total Appointment Time (timed & untimed codes): 40 minutes    Subjective     Date of Onset: 4/21/23    History of Current Condition/Mechanism of Injury: Lane reports: he cut R thumb and underwent surgery for repair. Pt was casted then splinted and presents today in OTC splint.    Involved Side: Right  Dominant Side: Right    Mechanism of Injury: cut  Surgical Procedure: R thumb extensor tendon repair  Imaging: none     Prior Therapy: none    Pain:  Functional Pain Scale Rating 0-10:   0 /10 on average  8/10 at best  0 /10 at worst  Location: over scar  Description: Sharp and Shooting  Aggravating Factors: hitting it  Easing Factors:  rest    Occupation:  student and works in film industry  Working presently: employed  Duties:  and electric for film set    Functional Limitations/Social History:    Previous functional status includes: Independent with all ADLs.     Current Functional Status   Home/Living environment: lives with a friend      Limitation of Functional Status as follows:   ADLs/IADLs:     - Feeding: ind    - Bathing: ind    - Dressing/Grooming: ind    - Home Management: ind    - Driving: ind     Leisure: fishing, working  out    Patient's Goals for Therapy: return to prior level of function    Past Medical History/Physical Systems Review:   Lane Blancas  has a past medical history of ADHD, Anxiety, and Seizures.    Lane Blancas  has a past surgical history that includes Wound exploration (Right, 4/28/2023) and Repair of extensor tendon (Right, 4/28/2023).    Lane has a current medication list which includes the following prescription(s): methylphenidate hcl and tramadol.    Review of patient's allergies indicates:  No Known Allergies     Objective     Observation/Appearance: healed scar over R thumb MP joint    Edema. Measured in centimeters.   6/12/2023 6/12/2023      Left Right     Wrist Crease 16.3 16.4     Distal Palmar Crease 20.4 19.7     Thumb P1 6.2 6.4         Hand ROM. Measured in degrees.   6/12/2023 6/12/2023      Left Right            Thumb: MP 60 40                  IP 70 30         Rad ABD 50 50         Pal ABD 50 50            Opposition Base of SF SF P2        Strength (Dynamometer) and Pinch Strength (Pinch Gauge)  Measured in pounds.   6/12/2023 6/12/2023      Left Right     Rung II NT NT     Key Pinch       3pt Pinch         Sensation: no numbness or tingling reported; not formally assessed    CMS Impairment/Limitation/Restriction for FOTO Hand Survey    Therapist reviewed FOTO scores for Lane Blancas on 6/12/2023.   FOTO documents entered into [employer] - see Media section.    Limitation Score: 45%         Treatment     Total Treatment time (time-based codes) separate from Evaluation: 25 minutes    Lane received the following supervised modalities after being cleared for contradictions for 10 minutes:   -Paraffin to decrease pain and stiffness and increase tissue elasticity    Lane received the following manual therapy techniques for 5 minutes:   -IASTM and scar massage    Lane received therapeutic exercises for 10 minutes including:  -Thumb AROM    Patient Education and Home Exercises       Education provided:   -role of OT, goals for OT, scheduling/cancellations, insurance limitations with patient.  -Additional Education provided: precautions and progression of treatment    Written Home Exercises Provided: yes.  Exercises were reviewed and Lane was able to demonstrate them prior to the end of the session.    Lane demonstrated good  understanding of the education provided.     Pt was advised to perform these exercises free of pain, and to stop performing them if pain occurs.    See EMR under Patient Instructions for exercises provided 6/12/2023.    Assessment     Lane Blancas is a 23 y.o. male referred to outpatient occupational therapy and presents with a medical diagnosis of R thumb extensor tendon repair.      Assessment of Occupational Performance   Performance Deficits    Physical:  Joint Mobility  Muscle Power/Strength  Skin Integrity/Scar Formation  Edema  Pain    Cognitive:  No Deficits    Psychosocial:    No Deficits     Following medical record review it is determined that pt will benefit from occupational therapy services in order to maximize pain free and/or functional use of right thumb. The following goals were discussed with the patient and patient is in agreement with them as to be addressed in the treatment plan. The patient's rehab potential is Excellent.     Anticipated barriers to occupational therapy: none    Plan of care discussed with patient: Yes  Patient's spiritual, cultural and educational needs considered and patient is agreeable to the plan of care and goals as stated below:     Medical Necessity is demonstrated by the following  Occupational Profile/History  Co-morbidities and personal factors that may impact the plan of care [x] LOW: Brief chart review  [] MODERATE: Expanded chart review   [] HIGH: Extensive chart review    Moderate / High Support Documentation: see chart above     Examination  Performance deficits relating to physical, cognitive or  psychosocial skills that result in activity limitations and/or participation restrictions  [x] LOW: addressing 1-3 Performance deficits  [] MODERATE: 3-5 Performance deficits  [] HIGH: 5+ Performance deficits (please support below)    Moderate / High Support Documentation: see chart above     Treatment Options [x] LOW: Limited options  [] MODERATE: Several options  [] HIGH: Multiple options      Decision Making/ Complexity Score: low       The following goals were discussed with the patient and patient is in agreement with them as to be addressed in the treatment plan.     Goals:   The following goals were discussed with the patient and patient is in agreement with them as to be addressed in the treatment plan.   Short term Goals:  1) Initiate HEP  2) Pt will increase AROM of R thumb by 5-10 degrees in order to assist with gripping by 4 weeks.  3) Pt will reduce pain to less than 3/10 by 4 weeks.  4) Assess  and pinch strength  5) Patient will be able to achieve less than or equal to 35% on the FOTO, demonstrating overall improved functional ability with upper extremity. (Self-care category)    Long Term Goals:  Goals to be met by discharge:  1) Independent with HEP  2) Pt will increase R thumb CHAUDHARY by 20 degrees in order to increase functional use for grasp with home management or work related tasks by d/c.   3) Pt will increase functional  strength by 5# in order to A in work tasks by 4 weeks  4) Pt will decrease pain to trace or none while completing light home management tasks or work related tasks by d/c.   5) Patient will be able to achieve less than or equal to 25% on the FOTO, demonstrating overall improved functional ability with upper extremity.  (Self-care category)      Plan     Certification Period/Plan of care expiration: 6/12/2023 to 9/8/23.    Outpatient Occupational Therapy 2 times weekly for 10 weeks to include the following interventions: Paraffin, Manual therapy/joint mobilizations,  Therapeutic exercises/activities., Strengthening, Edema Control, and Scar Management.    Margaret Boasberg, OT

## 2023-06-12 NOTE — PATIENT INSTRUCTIONS
"[facility], OCCUPATIONAL THERAPY  HOME EXERCISE PROGRAM     Complete the following massage for 3 minutes, 3x/day:                                Using lotion and medium pressure, massage on and around scar in circles, side to side and up and down    Complete the following exercises for 10 repetitions, 4x/day:             AROM: Composite Flexion   Bend both joints of thumb as far as possible.   Try to touch base of little finger.      AROM: Thumb IP Flexion / Extension  Brace thumb below tip joint. Bend joint as far as   possible then straighten.      AROM: Palmar Adduction / Abduction   Rest your small finger on the table. Move thumb   sideways, out and away from   palm. Move back to rest along palm.      AROM: Radial Adduction / Abduction  Place your palm flat on the table. Move thumb out   to side. Move back alongside index finger.                                                         AROM: Composite Flesion ("Pinky Slides")  Touch thumb to tip of small finger. Slide thumb down   small finger into palm.         AROM: MP Extension   With palm on table, lift thumb up.   Hold 3 seconds. Relax and lower thumb.      Therapist: Maggie Boasberg, OTR/CHT       "

## 2023-06-22 ENCOUNTER — DOCUMENTATION ONLY (OUTPATIENT)
Dept: REHABILITATION | Facility: HOSPITAL | Age: 23
End: 2023-06-22
Payer: COMMERCIAL

## 2023-06-22 NOTE — PROGRESS NOTES
No Show Note/Documentation    Patient: Lane Blancas  Date of session: 6/22/2023  Chart Number: [id number]     Lane Blancas did not attend his scheduled therapy appointment today. He did not call to cancel nor reschedule. This is the 2nd appointment that he has not attended. Next appointment is scheduled for 6/26/23 and will follow up with patient at that time. No charges have been posted today.     Inez Al OTR/L

## 2023-06-26 ENCOUNTER — CLINICAL SUPPORT (OUTPATIENT)
Dept: REHABILITATION | Facility: HOSPITAL | Age: 23
End: 2023-06-26
Payer: COMMERCIAL

## 2023-06-26 DIAGNOSIS — M25.641 STIFFNESS OF RIGHT HAND, NOT ELSEWHERE CLASSIFIED: Primary | ICD-10-CM

## 2023-06-26 DIAGNOSIS — M79.644 PAIN OF RIGHT THUMB: ICD-10-CM

## 2023-06-26 PROCEDURE — 97140 MANUAL THERAPY 1/> REGIONS: CPT

## 2023-06-26 PROCEDURE — 97110 THERAPEUTIC EXERCISES: CPT

## 2023-06-26 PROCEDURE — 97018 PARAFFIN BATH THERAPY: CPT

## 2023-06-26 NOTE — PROGRESS NOTES
Occupational Therapy Treatment Note     Date: 6/26/2023  Name: Lane Blancas  Clinic Number: [id number]    Therapy Diagnosis:   Encounter Diagnoses   Name Primary?    Stiffness of right hand, not elsewhere classified Yes    Pain of right thumb      Physician: Sheri Ahumada PA-C    Physician Orders: Eval and Treat  Medical Diagnosis: Z98.890 (ICD-10-CM) - Post-operative state  Surgical Procedure and Date: R thumb extensor tendon repair, 4/28/23 / Date of Injury: 4/21/23  Evaluation Date: 6/12/2023  Insurance Authorization Period Expiration: 12/31/23  Plan of Care Certification Period: 9/8/23  Date of Return to MD: TERRIE  Visit # / Visits authorized: 1 / 1    Time In: 12:30 pm  Time Out: 1:15 pm  Total Billable Time: 45 minutes    Precautions:  Standard      Subjective     Pt reports: he has no pain at rest but he c/o occasional pain with certain movements  he was compliant with home exercise program given last session.   Response to previous treatment: increased ROM    Pain: 0/10 at rest; 8/10 with certain movements  Location: right thumb      OBJECTIVE     Observation/Appearance: healed scar over R thumb MP joint     Edema. Measured in centimeters.    6/12/2023 6/12/2023         Left Right       Wrist Crease 16.3 16.4       Distal Palmar Crease 20.4 19.7       Thumb P1 6.2 6.4             Hand ROM. Measured in degrees.    6/12/2023 6/12/2023 6/26/23        Left Right  Right                 Thumb: MP 60 40  55                  IP 70 30  40         Rad ABD 50 50  50         Pal ABD 50 50  55            Opposition Base of SF SF P2  Base of SF         Strength (Dynamometer) and Pinch Strength (Pinch Gauge)  Measured in pounds.    6/26/2023 6/26/2023         Left Right       Rung II 86 55.2       Key Pinch 19 15       3pt Pinch  9 11           Treatment     Lane received the following supervised modalities after being cleared for contradictions for 10 minutes:   -Paraffin to decrease pain and stiffness and  increase tissue elasticity    Lane received the following manual therapy techniques for 10 minutes:   -IASTM and scar suction to decrease stiffness and adhesions in surrounding tissues    Lane received therapeutic exercises for 25 minutes including:  -updated measurements  -thumb AROM  -Red flexbar smiles, frowns and twsits x 20 reps  -Pom pom  with green clothespins x 2 container  -issued silicon scar pad for scar management    Home Exercises and Education Provided     Education provided:   - precautions and progression of treatment  - Progress towards goals     Written Home Exercises Provided: Patient instructed to cont prior HEP.  Exercises were reviewed and Lane was able to demonstrate them prior to the end of the session.  Lane demonstrated good  understanding of the HEP provided.   .   See EMR under Patient Instructions for exercises provided prior visit.        Assessment     Pt would continue to benefit from skilled OT. He performed well in session. Pt demonstrates increased ROM. Initial  and pinch measurements taken and began gentle strengthening. Plan to progress as tolerated.      Lane is progressing well towards his goals and there are no updates to goals at this time. Pt prognosis is Excellent.     Pt will continue to benefit from skilled outpatient occupational therapy to address the deficits listed in the problem list on initial evaluation provide pt/family education and to maximize pt's level of independence in the home and community environment.     Anticipated barriers to occupational therapy: none    Pt's spiritual, cultural and educational needs considered and pt agreeable to plan of care and goals.    Goals:  The following goals were discussed with the patient and patient is in agreement with them as to be addressed in the treatment plan.   Short term Goals:  1) Initiate HEP  2) Pt will increase AROM of R thumb by 5-10 degrees in order to assist with gripping by 4 weeks.  3) Pt will  reduce pain to less than 3/10 by 4 weeks.  4) Assess  and pinch strength  5) Patient will be able to achieve less than or equal to 35% on the FOTO, demonstrating overall improved functional ability with upper extremity. (Self-care category)     Long Term Goals:  Goals to be met by discharge:  1) Independent with HEP  2) Pt will increase R thumb CHAUDHARY by 20 degrees in order to increase functional use for grasp with home management or work related tasks by d/c.   3) Pt will increase functional  strength by 5# in order to A in work tasks by 4 weeks  4) Pt will decrease pain to trace or none while completing light home management tasks or work related tasks by d/c.   5) Patient will be able to achieve less than or equal to 25% on the FOTO, demonstrating overall improved functional ability with upper extremity.  (Self-care category)    Plan   Continue with Plan of Care  Updates/Grading for next session: progress as tolerated      Margaret Boasberg, OT

## 2023-08-28 DIAGNOSIS — F90.0 ATTENTION DEFICIT HYPERACTIVITY DISORDER (ADHD), PREDOMINANTLY INATTENTIVE TYPE: ICD-10-CM

## 2023-08-28 RX ORDER — METHYLPHENIDATE HYDROCHLORIDE 18 MG/1
18 TABLET ORAL EVERY MORNING
Qty: 30 TABLET | Refills: 0 | Status: SHIPPED | OUTPATIENT
Start: 2023-08-28 | End: 2023-09-20 | Stop reason: SDUPTHER

## 2023-08-28 NOTE — TELEPHONE ENCOUNTER
LOV: 3/7/2023  Upcoming appointment set with 9/20/2023 with Dr RICKI Dailey to establish care. Patient informed request will be sent to provider for further advising. Understanding voiced.

## 2023-09-20 ENCOUNTER — OFFICE VISIT (OUTPATIENT)
Dept: INTERNAL MEDICINE | Facility: CLINIC | Age: 23
End: 2023-09-20
Payer: COMMERCIAL

## 2023-09-20 VITALS
WEIGHT: 130.94 LBS | SYSTOLIC BLOOD PRESSURE: 120 MMHG | BODY MASS INDEX: 22.48 KG/M2 | HEART RATE: 74 BPM | DIASTOLIC BLOOD PRESSURE: 78 MMHG | OXYGEN SATURATION: 100 %

## 2023-09-20 DIAGNOSIS — F90.0 ATTENTION DEFICIT HYPERACTIVITY DISORDER (ADHD), PREDOMINANTLY INATTENTIVE TYPE: ICD-10-CM

## 2023-09-20 PROCEDURE — 1159F MED LIST DOCD IN RCRD: CPT | Mod: CPTII,S$GLB,, | Performed by: STUDENT IN AN ORGANIZED HEALTH CARE EDUCATION/TRAINING PROGRAM

## 2023-09-20 PROCEDURE — 3008F PR BODY MASS INDEX (BMI) DOCUMENTED: ICD-10-PCS | Mod: CPTII,S$GLB,, | Performed by: STUDENT IN AN ORGANIZED HEALTH CARE EDUCATION/TRAINING PROGRAM

## 2023-09-20 PROCEDURE — 3078F PR MOST RECENT DIASTOLIC BLOOD PRESSURE < 80 MM HG: ICD-10-PCS | Mod: CPTII,S$GLB,, | Performed by: STUDENT IN AN ORGANIZED HEALTH CARE EDUCATION/TRAINING PROGRAM

## 2023-09-20 PROCEDURE — 99999 PR PBB SHADOW E&M-EST. PATIENT-LVL III: ICD-10-PCS | Mod: PBBFAC,,, | Performed by: STUDENT IN AN ORGANIZED HEALTH CARE EDUCATION/TRAINING PROGRAM

## 2023-09-20 PROCEDURE — 1159F PR MEDICATION LIST DOCUMENTED IN MEDICAL RECORD: ICD-10-PCS | Mod: CPTII,S$GLB,, | Performed by: STUDENT IN AN ORGANIZED HEALTH CARE EDUCATION/TRAINING PROGRAM

## 2023-09-20 PROCEDURE — 99395 PREV VISIT EST AGE 18-39: CPT | Mod: S$GLB,,, | Performed by: STUDENT IN AN ORGANIZED HEALTH CARE EDUCATION/TRAINING PROGRAM

## 2023-09-20 PROCEDURE — 99999 PR PBB SHADOW E&M-EST. PATIENT-LVL III: CPT | Mod: PBBFAC,,, | Performed by: STUDENT IN AN ORGANIZED HEALTH CARE EDUCATION/TRAINING PROGRAM

## 2023-09-20 PROCEDURE — 3078F DIAST BP <80 MM HG: CPT | Mod: CPTII,S$GLB,, | Performed by: STUDENT IN AN ORGANIZED HEALTH CARE EDUCATION/TRAINING PROGRAM

## 2023-09-20 PROCEDURE — 3074F SYST BP LT 130 MM HG: CPT | Mod: CPTII,S$GLB,, | Performed by: STUDENT IN AN ORGANIZED HEALTH CARE EDUCATION/TRAINING PROGRAM

## 2023-09-20 PROCEDURE — 3074F PR MOST RECENT SYSTOLIC BLOOD PRESSURE < 130 MM HG: ICD-10-PCS | Mod: CPTII,S$GLB,, | Performed by: STUDENT IN AN ORGANIZED HEALTH CARE EDUCATION/TRAINING PROGRAM

## 2023-09-20 PROCEDURE — 99395 PR PREVENTIVE VISIT,EST,18-39: ICD-10-PCS | Mod: S$GLB,,, | Performed by: STUDENT IN AN ORGANIZED HEALTH CARE EDUCATION/TRAINING PROGRAM

## 2023-09-20 PROCEDURE — 3008F BODY MASS INDEX DOCD: CPT | Mod: CPTII,S$GLB,, | Performed by: STUDENT IN AN ORGANIZED HEALTH CARE EDUCATION/TRAINING PROGRAM

## 2023-09-20 RX ORDER — METHYLPHENIDATE HYDROCHLORIDE 18 MG/1
18 TABLET ORAL EVERY MORNING
Qty: 30 TABLET | Refills: 0 | Status: SHIPPED | OUTPATIENT
Start: 2023-09-20 | End: 2024-02-16 | Stop reason: SDUPTHER

## 2023-09-20 NOTE — PROGRESS NOTES
"[facility]    Subjective:         Patient ID: Lane Blancas is a 23 y.o. male.    Chief Complaint: Establish Care    History was obtained from the patient and supplemented through chart review.    HPI:  Patient is a 23 y.o. male who presents to [facility].  Patient has ADD for which he is on Concerta. He has no other medical conditions.  He reports he was put on it in 8th grade causes he was not doing well in school and had trouble focusing. Freshman year of college went a year without it and did "fine," but felt needed to get back on it because he was better able to learn while on it.  He reports he is tried multiple ADD medications in the past and found that Concerta has worked best. Tried aderall xr in 8th grade but it made him too irritable.     Has no other complaints today.  Review of systems negative for palpitations, shortness of breath, fever, chills, chest pain.  Patient reports he eats lot of chicken quesadillas and exercises regularly by boxing with a punching bag.  Patient reports he has a nicotine vape which he uses frequently.  Is aware he needs to try cut back eventually stop this.  Does have a history of anxiety for which he was on Zoloft however he denies depression and anxiety today.    Medical History  Past Medical History:   Diagnosis Date    ADHD     diagnosed 7th grade    Anxiety     Seizures     2-5 years old           Surgical hx, family hx, social hx   No family history on file.  Past Surgical History:   Procedure Laterality Date    REPAIR OF EXTENSOR TENDON Right 4/28/2023    Procedure: REPAIR, TENDON, EXTENSOR;  Surgeon: Khadra Jasso MD;  Location: [facility] OR;  Service: Orthopedics;  Laterality: Right;  Right thumb extensor tendon repair    WOUND EXPLORATION Right 4/28/2023    Procedure: EXPLORATION, WOUND;  Surgeon: Khadra Jasso MD;  Location: [facility] OR;  Service: Orthopedics;  Laterality: Right;  right thumb wound exploration     Social " History     Socioeconomic History    Marital status: Single   Tobacco Use    Smoking status: Every Day     Types: Vaping with nicotine    Smokeless tobacco: Never   Substance and Sexual Activity    Alcohol use: Yes     Alcohol/week: 12.0 standard drinks of alcohol     Types: 12 Shots of liquor per week     Comment: 3x weekly    Drug use: Yes     Types: Marijuana    Sexual activity: Yes     Partners: Female     Birth control/protection: Condom   Social History Narrative    Moved 6/2018 from [place], [place].  Mother a pediatric hospitalist.  Lives with mother, father, 1 cat; 3 siblings out of state in college ([employer], [place]); no smoking, smoke detectors (currently renting), no firearms       Immunization History   Administered Date(s) Administered    COVID-19, MRNA, LN-S, PF (MODERNA FULL 0.5 ML DOSE) 04/02/2021, 05/05/2021    Dtap, Unspecified Formulation 2002, 2002, 2002, 05/21/2003, 03/24/2006    HIB 2002, 2002, 02/21/2003    HPV 9-Valent 07/22/2013, 09/19/2018    Hepatitis A, Pediatric/Adolescent, 2 Dose 03/02/2004, 03/18/2005    Hepatitis B, Pediatric/Adolescent 2002, 2002, 02/21/2003    IPV 2002, 2002, 02/21/2003, 03/24/2006    Influenza 10/09/2017    Influenza - Quadrivalent - PF *Preferred* (6 months and older) 09/19/2018, 12/20/2022    MMR 02/21/2003, 03/24/2006    Meningococcal 07/22/2013    Meningococcal Conjugate (MCV4P) 09/19/2018    Pneumococcal Conjugate - 13 Valent 2002, 2002, 02/24/2003, 05/21/2003    Tdap 07/22/2013, 04/24/2023    Varicella 05/25/2003, 06/04/2007       Current Outpatient Medications   Medication Instructions    methylphenidate HCl 18 mg, Oral, Every morning    traMADoL (ULTRAM) 50 mg, Oral, Every 6 hours PRN         Objective:        Body mass index is 22.48 kg/m².  Vitals:    09/20/23 1101   BP: 120/78   Pulse: 74   SpO2: 100%   Weight: 59.4 kg (130 lb 15.3 oz)   PainSc: 0-No pain     Physical  Exam  Constitutional:       General: He is not in acute distress.     Appearance: Normal appearance.   HENT:      Head: Normocephalic.      Nose: Nose normal.   Eyes:      Extraocular Movements: Extraocular movements intact.   Cardiovascular:      Rate and Rhythm: Normal rate and regular rhythm.      Heart sounds: No murmur heard.     No gallop.   Pulmonary:      Effort: Pulmonary effort is normal.      Breath sounds: Normal breath sounds. No wheezing.   Abdominal:      General: Bowel sounds are normal.      Palpations: Abdomen is soft.      Tenderness: There is no abdominal tenderness. There is no guarding.   Musculoskeletal:         General: No swelling or tenderness.      Cervical back: Normal range of motion and neck supple.      Right lower leg: No edema.      Left lower leg: No edema.   Skin:     General: Skin is warm and dry.      Findings: No rash.   Neurological:      General: No focal deficit present.      Mental Status: He is alert and oriented to person, place, and time.   Psychiatric:         Mood and Affect: Mood normal.         Behavior: Behavior normal.           Lab Results   Component Value Date    WBC 4.32 12/20/2022    HGB 15.3 12/20/2022    HCT 45.5 12/20/2022     12/20/2022    CHOL 127 12/20/2022    TRIG 71 12/20/2022    HDL 63 12/20/2022    ALT 14 12/20/2022    AST 28 12/20/2022     12/20/2022    K 4.3 12/20/2022     12/20/2022    CREATININE 0.9 12/20/2022    BUN 10 12/20/2022    CO2 25 12/20/2022    TSH 1.513 12/20/2022       The ASCVD Risk score (Camarillo DK, et al., 2019) failed to calculate for the following reasons:    The 2019 ASCVD risk score is only valid for ages 40 to 79    Assessment:         1. Attention deficit hyperactivity disorder (ADHD), predominantly inattentive type          Plan:     1. Attention deficit hyperactivity disorder (ADHD), predominantly inattentive type  - methylphenidate HCl 18 MG CR tablet; Take 1 tablet (18 mg total) by mouth every morning.   Dispense: 30 tablet; Refill: 0      Health Maintenance  - Lipids:  Were normal, repeat every 5 years  - A1C:  Not indicated, we will need flu and COVID-19 booster       Tests to Keep You Healthy    Tobacco Cessation: NO    Follow up in about 6 months (around 3/20/2024). or sooner prn        Abdoulaye Dailey  [facility]  [address] [address]  Phone [phone]  Fax [phone]    This note is dictated using the M*Modal Fluency Direct word recognition program. It may contain word recognition mistakes or wrong word substitutions that were missed on review.     The pt is amenable to continue in her outpatient treatment with psychiatrist Dr. Mckeon ( appointment date pending)  and with her psychologist Dr. Condon with next appointment for therapy on 8/24/23 at 4 pm  The pt is also amenable to follow up with her PCP Dr. New with next appointment on 8/29/23 at 9 am

## 2023-10-06 ENCOUNTER — OFFICE VISIT (OUTPATIENT)
Dept: URGENT CARE | Facility: CLINIC | Age: 23
End: 2023-10-06
Payer: COMMERCIAL

## 2023-10-06 VITALS
HEIGHT: 64 IN | RESPIRATION RATE: 19 BRPM | HEART RATE: 86 BPM | DIASTOLIC BLOOD PRESSURE: 82 MMHG | SYSTOLIC BLOOD PRESSURE: 139 MMHG | WEIGHT: 128.5 LBS | TEMPERATURE: 99 F | BODY MASS INDEX: 21.94 KG/M2 | OXYGEN SATURATION: 98 %

## 2023-10-06 DIAGNOSIS — Z20.2 EXPOSURE TO STD: Primary | ICD-10-CM

## 2023-10-06 DIAGNOSIS — Z11.3 SCREENING FOR STD (SEXUALLY TRANSMITTED DISEASE): ICD-10-CM

## 2023-10-06 LAB
HCV AB SERPL QL IA: NORMAL
HIV 1+2 AB+HIV1 P24 AG SERPL QL IA: NORMAL

## 2023-10-06 PROCEDURE — 99213 PR OFFICE/OUTPT VISIT, EST, LEVL III, 20-29 MIN: ICD-10-PCS | Mod: S$GLB,,, | Performed by: NURSE PRACTITIONER

## 2023-10-06 PROCEDURE — 86803 HEPATITIS C AB TEST: CPT | Performed by: NURSE PRACTITIONER

## 2023-10-06 PROCEDURE — 87591 N.GONORRHOEAE DNA AMP PROB: CPT | Performed by: NURSE PRACTITIONER

## 2023-10-06 PROCEDURE — 86592 SYPHILIS TEST NON-TREP QUAL: CPT | Performed by: NURSE PRACTITIONER

## 2023-10-06 PROCEDURE — 99213 OFFICE O/P EST LOW 20 MIN: CPT | Mod: S$GLB,,, | Performed by: NURSE PRACTITIONER

## 2023-10-06 PROCEDURE — 87798 DETECT AGENT NOS DNA AMP: CPT | Mod: 59 | Performed by: NURSE PRACTITIONER

## 2023-10-06 PROCEDURE — 87389 HIV-1 AG W/HIV-1&-2 AB AG IA: CPT | Performed by: NURSE PRACTITIONER

## 2023-10-06 NOTE — PROGRESS NOTES
"Subjective:      Patient ID: Lane Blancas is a 23 y.o. male.    Vitals:  height is 5' 4" (1.626 m) and weight is 58.3 kg (128 lb 8.5 oz). His oral temperature is 98.7 °F (37.1 °C). His respiration is 19 and oxygen saturation is 98%.     Chief Complaint: Exposure to STD    Patient presents an exposure to a std, no symptoms.     22 yo man, presents to clinic for std testing.  He was informed by a female partner that she tested positive for ureaplasma.  Had UPI with this new partner 3 weeks ago.He had STD testing 6 months ago. Pt denies any urinary urgency, frequency, urethral d/c, flank, abdominal pain.  Denies any h/o STI's.      Exposure to STD  This is a new problem. The current episode started today. The patient is experiencing no pain. He is sexually active. No, his partner does not have an STD.       Skin:  Negative for erythema.      Objective:     Physical Exam   Constitutional: He is oriented to person, place, and time. He appears well-developed.   HENT:   Head: Normocephalic and atraumatic. Head is without abrasion, without contusion and without laceration.   Ears:   Right Ear: External ear normal.   Left Ear: External ear normal.   Nose: Nose normal.   Mouth/Throat: Oropharynx is clear and moist and mucous membranes are normal.   Eyes: Conjunctivae, EOM and lids are normal. Pupils are equal, round, and reactive to light.   Neck: Trachea normal and phonation normal. Neck supple.   Cardiovascular: Normal rate, regular rhythm and normal heart sounds.   Pulmonary/Chest: Effort normal and breath sounds normal. No stridor. No respiratory distress.   Musculoskeletal: Normal range of motion.         General: Normal range of motion.   Neurological: He is alert and oriented to person, place, and time.   Skin: Skin is warm, dry, intact and no rash. No abrasion, No burn, No bruising, No erythema and No ecchymosis   Psychiatric: His speech is normal and behavior is normal. Judgment and thought content normal. "   Nursing note and vitals reviewed.      Assessment:     1. Exposure to STD    2. Screening for STD (sexually transmitted disease)        Plan:   I explained to the patient that ureaplasma is difficult to treat once diagnosed.  Oftentimes, it does not cause any symptoms or morbidity.  Advised against testing for it unless symptomatic.  Patient wishes to proceed.    Patient Instructions   STD Screening     You were tested for sexual transmitted diseases today, we will call you in 3-7 days with the results of the testing. If you need more medication when the labs result come in, we will call you and phone them in for you.  PLEASE SET UP YOUR [employer] ACCOUNT SO THAT YOU CAN RECEIVE YOUR LAB RESULTS ONCE THEY RETURN.  Strict condom use advised.  Please remain abstinent until further notice.         IF POSITIVE:    Notify sexual partners of the need for testing.    NO sexual intercourse until given all lab results and appropriate treatment. Avoid sexual intercourse for 7 days until you and your partner have been treated.     Complete ALL medications prescribed (if required).  Please supplement with OTC probiotics and yogurt if prescribed antibiotics. Take probiotics or eat yogurt 4 hours after you take antibiotics.    Re-test to ensure infection has cleared-there are infections that require more agressive treatment.  Retest for all in 6 weeks (if still have symptoms) and again in 3-6 months to ensure true negative results.  You may be instructed to re-test for cure sooner than 6 weeks.     Today's testing will give no crediable information if you have unprotected sexual activities going forward.      Today's testing will give no crediable information if you have unprotected sexual activities going forward.      REMEMBER WEAR CONDOMS AND GET TESTED OFTEN.             Discussed diagnosis with patient as well as treatment and home care. Discussed return to clinic precautions vs ER precautions. All patients questions  answered. Patient verbalized understanding. Patient agreed with plan of care.     You must understand that you have received an Urgent Care treatment only and that you may be released before all of your medical problems are known or treated.      Exposure to STD  -     C. trachomatis/N. gonorrhoeae by AMP DNA [facility]; Urine  -     Hepatitis C Antibody  -     Ureaplasma PCR Urine  -     HIV 1/2 Ag/Ab (4th Gen)  -     RPR    Screening for STD (sexually transmitted disease)  -     C. trachomatis/N. gonorrhoeae by AMP DNA [facility]; Urine  -     Hepatitis C Antibody  -     Ureaplasma PCR Urine  -     HIV 1/2 Ag/Ab (4th Gen)  -     RPR

## 2023-10-06 NOTE — PATIENT INSTRUCTIONS
STD Screening     You were tested for sexual transmitted diseases today, we will call you in 3-7 days with the results of the testing. If you need more medication when the labs result come in, we will call you and phone them in for you.  PLEASE SET UP YOUR [employer] ACCOUNT SO THAT YOU CAN RECEIVE YOUR LAB RESULTS ONCE THEY RETURN.  Strict condom use advised.  Please remain abstinent until further notice.         IF POSITIVE:    Notify sexual partners of the need for testing.    NO sexual intercourse until given all lab results and appropriate treatment. Avoid sexual intercourse for 7 days until you and your partner have been treated.     Complete ALL medications prescribed (if required).  Please supplement with OTC probiotics and yogurt if prescribed antibiotics. Take probiotics or eat yogurt 4 hours after you take antibiotics.    Re-test to ensure infection has cleared-there are infections that require more agressive treatment.  Retest for all in 6 weeks (if still have symptoms) and again in 3-6 months to ensure true negative results.  You may be instructed to re-test for cure sooner than 6 weeks.     Today's testing will give no crediable information if you have unprotected sexual activities going forward.      Today's testing will give no crediable information if you have unprotected sexual activities going forward.      REMEMBER WEAR CONDOMS AND GET TESTED OFTEN.             Discussed diagnosis with patient as well as treatment and home care. Discussed return to clinic precautions vs ER precautions. All patients questions answered. Patient verbalized understanding. Patient agreed with plan of care.     You must understand that you have received an Urgent Care treatment only and that you may be released before all of your medical problems are known or treated.

## 2023-10-07 LAB
C TRACH DNA SPEC QL NAA+PROBE: NOT DETECTED
N GONORRHOEA DNA SPEC QL NAA+PROBE: NOT DETECTED
RPR SER QL: NORMAL

## 2023-10-10 ENCOUNTER — PATIENT MESSAGE (OUTPATIENT)
Dept: URGENT CARE | Facility: CLINIC | Age: 23
End: 2023-10-10
Payer: COMMERCIAL

## 2023-10-10 DIAGNOSIS — N34.1 NONGONOCOCCAL URETHRITIS DUE TO UREAPLASMA UREALYTICUM: Primary | ICD-10-CM

## 2023-10-10 DIAGNOSIS — A49.3 NONGONOCOCCAL URETHRITIS DUE TO UREAPLASMA UREALYTICUM: Primary | ICD-10-CM

## 2023-10-10 LAB
SPECIMEN SOURCE: ABNORMAL
U PARVUM DNA SPEC QL NAA+PROBE: POSITIVE
U UREALYTICUM DNA SPEC QL NAA+PROBE: NEGATIVE

## 2023-10-10 RX ORDER — AZITHROMYCIN 500 MG/1
TABLET, FILM COATED ORAL
Qty: 2 TABLET | Refills: 0 | Status: SHIPPED | OUTPATIENT
Start: 2023-10-10

## 2023-10-10 RX ORDER — DOXYCYCLINE 100 MG/1
100 CAPSULE ORAL EVERY 12 HOURS
Qty: 14 CAPSULE | Refills: 0 | Status: SHIPPED | OUTPATIENT
Start: 2023-10-10 | End: 2023-10-18

## 2024-02-16 DIAGNOSIS — F90.0 ATTENTION DEFICIT HYPERACTIVITY DISORDER (ADHD), PREDOMINANTLY INATTENTIVE TYPE: ICD-10-CM

## 2024-02-22 ENCOUNTER — PATIENT OUTREACH (OUTPATIENT)
Dept: ADMINISTRATIVE | Facility: HOSPITAL | Age: 24
End: 2024-02-22
Payer: COMMERCIAL

## 2024-02-22 RX ORDER — METHYLPHENIDATE HYDROCHLORIDE 18 MG/1
18 TABLET ORAL EVERY MORNING
Qty: 30 TABLET | Refills: 0 | Status: SHIPPED | OUTPATIENT
Start: 2024-02-22 | End: 2024-03-21 | Stop reason: SDUPTHER

## 2024-02-22 NOTE — PROGRESS NOTES
Health Maintenance Due   Topic Date Due    Pneumococcal Vaccines (Age 0-64) (1 of 1 - PPSV23 or PCV20) 02/20/2008    Influenza Vaccine (1) 09/01/2023    COVID-19 Vaccine (3 - 2023-24 season) 09/01/2023     Health Maintenance reviewed, updated and Links triggered. Please address Immunization on up coming office  Visit with Dr. Dailey. 2/22/24 ([place])

## 2024-03-21 DIAGNOSIS — F90.0 ATTENTION DEFICIT HYPERACTIVITY DISORDER (ADHD), PREDOMINANTLY INATTENTIVE TYPE: ICD-10-CM

## 2024-03-21 RX ORDER — METHYLPHENIDATE HYDROCHLORIDE 18 MG/1
18 TABLET ORAL EVERY MORNING
Qty: 30 TABLET | Refills: 0 | Status: SHIPPED | OUTPATIENT
Start: 2024-03-21 | End: 2024-04-10 | Stop reason: SDUPTHER

## 2024-03-21 NOTE — TELEPHONE ENCOUNTER
LOV: 9/20/2023   No upcoming appointment on file at this time.     Allergies confirmed, medication pended and routed to PCP for advise.

## 2024-03-21 NOTE — TELEPHONE ENCOUNTER
No care due was identified.  [facility] Embedded Care Due Messages. Reference number: [id number].   3/21/2024 8:53:54 AM CDT

## 2024-04-10 ENCOUNTER — OFFICE VISIT (OUTPATIENT)
Dept: INTERNAL MEDICINE | Facility: CLINIC | Age: 24
End: 2024-04-10
Payer: COMMERCIAL

## 2024-04-10 VITALS
OXYGEN SATURATION: 99 % | HEIGHT: 64 IN | WEIGHT: 127.63 LBS | BODY MASS INDEX: 21.79 KG/M2 | HEART RATE: 71 BPM | SYSTOLIC BLOOD PRESSURE: 134 MMHG | DIASTOLIC BLOOD PRESSURE: 77 MMHG

## 2024-04-10 DIAGNOSIS — I86.1 VARICOCELE: Primary | ICD-10-CM

## 2024-04-10 DIAGNOSIS — F90.0 ATTENTION DEFICIT HYPERACTIVITY DISORDER (ADHD), PREDOMINANTLY INATTENTIVE TYPE: ICD-10-CM

## 2024-04-10 PROCEDURE — 99213 OFFICE O/P EST LOW 20 MIN: CPT | Mod: S$GLB,,, | Performed by: STUDENT IN AN ORGANIZED HEALTH CARE EDUCATION/TRAINING PROGRAM

## 2024-04-10 PROCEDURE — 3078F DIAST BP <80 MM HG: CPT | Mod: CPTII,S$GLB,, | Performed by: STUDENT IN AN ORGANIZED HEALTH CARE EDUCATION/TRAINING PROGRAM

## 2024-04-10 PROCEDURE — 99999 PR PBB SHADOW E&M-EST. PATIENT-LVL III: CPT | Mod: PBBFAC,,, | Performed by: STUDENT IN AN ORGANIZED HEALTH CARE EDUCATION/TRAINING PROGRAM

## 2024-04-10 PROCEDURE — 3075F SYST BP GE 130 - 139MM HG: CPT | Mod: CPTII,S$GLB,, | Performed by: STUDENT IN AN ORGANIZED HEALTH CARE EDUCATION/TRAINING PROGRAM

## 2024-04-10 PROCEDURE — 1159F MED LIST DOCD IN RCRD: CPT | Mod: CPTII,S$GLB,, | Performed by: STUDENT IN AN ORGANIZED HEALTH CARE EDUCATION/TRAINING PROGRAM

## 2024-04-10 PROCEDURE — 3008F BODY MASS INDEX DOCD: CPT | Mod: CPTII,S$GLB,, | Performed by: STUDENT IN AN ORGANIZED HEALTH CARE EDUCATION/TRAINING PROGRAM

## 2024-04-10 RX ORDER — METHYLPHENIDATE HYDROCHLORIDE 18 MG/1
18 TABLET ORAL EVERY MORNING
Qty: 30 TABLET | Refills: 0 | Status: SHIPPED | OUTPATIENT
Start: 2024-04-22 | End: 2024-04-10 | Stop reason: SDUPTHER

## 2024-04-10 RX ORDER — METHYLPHENIDATE HYDROCHLORIDE 18 MG/1
18 TABLET ORAL EVERY MORNING
Qty: 30 TABLET | Refills: 0 | Status: SHIPPED | OUTPATIENT
Start: 2024-05-23 | End: 2024-06-23

## 2024-04-10 RX ORDER — METHYLPHENIDATE HYDROCHLORIDE 18 MG/1
18 TABLET ORAL EVERY MORNING
Qty: 30 TABLET | Refills: 0 | Status: SHIPPED | OUTPATIENT
Start: 2024-04-22 | End: 2024-05-23

## 2024-04-10 NOTE — PROGRESS NOTES
"[facility]  Subjective:     Patient ID: Lane Blancas is a 24 y.o. male.  Chief Complaint:  Enlarged testicular veins  HPI:  Patient is a 24 y.o. male who   has a past medical history of ADHD, Anxiety, and Seizures.  who presents for the above chief complaint.  A few weeks ago he noticed that the veins above his left testicle were enlarged.  It is not painful.  It goes away when he lays down and is more pronounced when standing.  He was no other complaints today.    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  Notes that he was behind on credits and so is taking a very high course load this semester.  Continues to take methylphenidate which works well for him.    Current Outpatient Medications   Medication Instructions    azithromycin (ZITHROMAX) 500 MG tablet Take 2 tablets by mouth at one time with large meal    [START ON 4/22/2024] methylphenidate HCl 18 mg, Oral, Every morning    [START ON 5/23/2024] methylphenidate HCl 18 mg, Oral, Every morning     Objective:      Body mass index is 21.91 kg/m².  Vitals:    04/10/24 0945   BP: 134/77   Pulse: 71   SpO2: 99%   Weight: 57.9 kg (127 lb 10.3 oz)   Height: 5' 4" (1.626 m)   PainSc: 0-No pain     Physical Exam  Constitutional:       Appearance: Normal appearance.   Cardiovascular:      Rate and Rhythm: Normal rate.      Heart sounds: No murmur heard.  Pulmonary:      Effort: Pulmonary effort is normal. No respiratory distress.   Abdominal:      General: Abdomen is flat.   Genitourinary:     Penis: Uncircumcised.       Testes:         Right: Mass or tenderness not present.         Left: Varicocele present. Mass or tenderness not present.   Musculoskeletal:      Right lower leg: No edema.      Left lower leg: No edema.   Neurological:      Mental Status: He is alert.           Assessment:       1. Varicocele    2. Attention deficit hyperactivity disorder (ADHD), predominantly " inattentive type        Plan:     1. Varicocele  Discussed benign nature of this condition and that no further management is indicated unless he was experiencing pain or if he has abnormal semen analysis.  Discussed referral to urology for further guidance and consideration of semen analysis.  Patient will schedule this at a later time  - Ambulatory referral/consult to Urology; Future    2. Attention deficit hyperactivity disorder (ADHD), predominantly inattentive type  Provided refills.   - methylphenidate HCl 18 MG CR tablet; Take 1 tablet (18 mg total) by mouth every morning.  Dispense: 30 tablet; Refill: 0  - methylphenidate HCl 18 MG CR tablet; Take 1 tablet (18 mg total) by mouth every morning.  Dispense: 30 tablet; Refill: 0    Tests to Keep You Healthy    Tobacco Cessation: NO    No follow-ups on file. or sooner prn (as needed)            Abdoulaye Dailey  [facility]  [address] [address]  Phone [phone]  Fax [phone]    This note is dictated using the "[employer] Fluency Direct word recognition program. It may contain word recognition mistakes or wrong word substitutions that were missed on review.    Answers submitted by the patient for this visit:  Review of Systems Questionnaire (Submitted on 4/10/2024)  activity change: No  unexpected weight change: No  neck pain: No  hearing loss: No  rhinorrhea: No  trouble swallowing: No  eye discharge: No  visual disturbance: No  chest tightness: No  wheezing: No  chest pain: No  palpitations: No  blood in stool: No  constipation: No  vomiting: No  diarrhea: No  polydipsia: No  polyuria: No  difficulty urinating: No  urgency: No  hematuria: No  joint swelling: No  arthralgias: No  headaches: No  weakness: No  confusion: No  dysphoric mood: No

## 2024-04-23 NOTE — PROGRESS NOTES
Subjective:      Lane Blancas is a 24 y.o. male who was referred by Dr. Dailey for evaluation of his varicocele.    The patient presents today with concern for L sided varicocele. States that he first noticed it several months ago with intermittent swelling of the veins. This is painless. More swelling with standing and alleviated with rest.     Denies bothersome urinary symptoms. Denies scrotal trauma. Denies penile discharge or potential STD exposure.     The following portions of the patient's history were reviewed and updated as appropriate: allergies, current medications, past family history, past medical history, past social history, past surgical history and problem list.    Review of Systems  Constitutional: no fever or chills  ENT: no nasal congestion or sore throat  Respiratory: no cough or shortness of breath  Cardiovascular: no chest pain or palpitations  Gastrointestinal: no nausea or vomiting, tolerating diet  Genitourinary: as per HPI  Hematologic/Lymphatic: no easy bruising or lymphadenopathy  Musculoskeletal: no arthralgias or myalgias  Neurological: no seizures or tremors  Behavioral/Psych: no auditory or visual hallucinations     Objective:   Vitals:   Vitals:    04/24/24 1102   BP: 138/67   Pulse: 84   Resp: 18     Physical Exam   General: alert and oriented, no acute distress  Head: normocephalic, atraumatic  Neck: supple, normal ROM  Respiratory: Symmetric expansion, non-labored breathing  Cardiovascular: regular rate and rhythm  Abdomen: soft, non tender, non distended  Genitourinary:   Penis: normal, no lesions, patent orthotopic meatus, no plaques  Scrotum: varicocele: left, varicocele grade: 2, no rashes or skin changes;   Testes: descended bilaterally, no masses, nontender, normal epididymides bilaterally, no hydroceles  Skin: normal coloration and turgor, no rashes, no suspicious skin lesions noted  Neuro: alert and oriented x3, no gross deficits  Psych: normal judgment and insight,  "normal mood/affect, and non-anxious    Lab Review   Urinalysis demonstrates : no specimen  Lab Results   Component Value Date    WBC 4.32 12/20/2022    HGB 15.3 12/20/2022    HCT 45.5 12/20/2022    MCV 92 12/20/2022     12/20/2022     Lab Results   Component Value Date    CREATININE 0.9 12/20/2022    BUN 10 12/20/2022     No results found for: "PSA"  Imaging     Assessment:     1. Varicocele      Plan:   Diagnoses and all orders for this visit:    Varicocele  -     Ambulatory referral/consult to Urology  -     US Scrotum And Testicles; Future    Plan:  --Discussed the benign nature of a L sided varicocele  --Scrotal US for reassurance   --Follow up PRN      "

## 2024-04-24 ENCOUNTER — OFFICE VISIT (OUTPATIENT)
Dept: UROLOGY | Facility: CLINIC | Age: 24
End: 2024-04-24
Payer: COMMERCIAL

## 2024-04-24 VITALS
DIASTOLIC BLOOD PRESSURE: 67 MMHG | WEIGHT: 131.81 LBS | OXYGEN SATURATION: 100 % | RESPIRATION RATE: 18 BRPM | SYSTOLIC BLOOD PRESSURE: 138 MMHG | HEIGHT: 64 IN | BODY MASS INDEX: 22.5 KG/M2 | HEART RATE: 84 BPM

## 2024-04-24 DIAGNOSIS — I86.1 VARICOCELE: ICD-10-CM

## 2024-04-24 PROCEDURE — 3008F BODY MASS INDEX DOCD: CPT | Mod: CPTII,S$GLB,, | Performed by: NURSE PRACTITIONER

## 2024-04-24 PROCEDURE — 3075F SYST BP GE 130 - 139MM HG: CPT | Mod: CPTII,S$GLB,, | Performed by: NURSE PRACTITIONER

## 2024-04-24 PROCEDURE — 1160F RVW MEDS BY RX/DR IN RCRD: CPT | Mod: CPTII,S$GLB,, | Performed by: NURSE PRACTITIONER

## 2024-04-24 PROCEDURE — 3078F DIAST BP <80 MM HG: CPT | Mod: CPTII,S$GLB,, | Performed by: NURSE PRACTITIONER

## 2024-04-24 PROCEDURE — 1159F MED LIST DOCD IN RCRD: CPT | Mod: CPTII,S$GLB,, | Performed by: NURSE PRACTITIONER

## 2024-04-24 PROCEDURE — 99203 OFFICE O/P NEW LOW 30 MIN: CPT | Mod: S$GLB,,, | Performed by: NURSE PRACTITIONER

## 2024-04-27 ENCOUNTER — HOSPITAL ENCOUNTER (OUTPATIENT)
Dept: RADIOLOGY | Facility: OTHER | Age: 24
Discharge: HOME | End: 2024-04-27
Attending: NURSE PRACTITIONER
Payer: COMMERCIAL

## 2024-04-27 DIAGNOSIS — I86.1 VARICOCELE: ICD-10-CM

## 2024-04-27 PROCEDURE — 76870 US EXAM SCROTUM: CPT | Mod: TC

## 2024-04-27 PROCEDURE — 76870 US EXAM SCROTUM: CPT | Mod: 26,,, | Performed by: RADIOLOGY

## 2024-05-16 ENCOUNTER — OFFICE VISIT (OUTPATIENT)
Dept: URGENT CARE | Facility: CLINIC | Age: 24
End: 2024-05-16
Payer: COMMERCIAL

## 2024-05-16 VITALS
HEIGHT: 64 IN | WEIGHT: 126 LBS | DIASTOLIC BLOOD PRESSURE: 62 MMHG | OXYGEN SATURATION: 99 % | BODY MASS INDEX: 21.51 KG/M2 | SYSTOLIC BLOOD PRESSURE: 134 MMHG | HEART RATE: 74 BPM | RESPIRATION RATE: 20 BRPM | TEMPERATURE: 98 F

## 2024-05-16 DIAGNOSIS — Z11.3 SCREEN FOR STD (SEXUALLY TRANSMITTED DISEASE): Primary | ICD-10-CM

## 2024-05-16 LAB
HAV IGM SERPL QL IA: NORMAL
HBV CORE IGM SERPL QL IA: NORMAL
HBV SURFACE AG SERPL QL IA: NORMAL
HCV AB SERPL QL IA: NORMAL
HIV 1+2 AB+HIV1 P24 AG SERPL QL IA: NORMAL

## 2024-05-16 PROCEDURE — 87491 CHLMYD TRACH DNA AMP PROBE: CPT

## 2024-05-16 PROCEDURE — 80074 ACUTE HEPATITIS PANEL: CPT

## 2024-05-16 PROCEDURE — 36415 COLL VENOUS BLD VENIPUNCTURE: CPT

## 2024-05-16 PROCEDURE — 87389 HIV-1 AG W/HIV-1&-2 AB AG IA: CPT

## 2024-05-16 PROCEDURE — 87661 TRICHOMONAS VAGINALIS AMPLIF: CPT

## 2024-05-16 PROCEDURE — 86593 SYPHILIS TEST NON-TREP QUANT: CPT

## 2024-05-16 PROCEDURE — 99213 OFFICE O/P EST LOW 20 MIN: CPT | Mod: S$GLB,,,

## 2024-05-16 NOTE — PROGRESS NOTES
"Subjective:      Patient ID: Lane Blancas is a 24 y.o. male.    Vitals:  height is 5' 4" (1.626 m) and weight is 57.2 kg (126 lb). His oral temperature is 98.2 °F (36.8 °C). His blood pressure is 134/62 and his pulse is 74. His respiration is 20 and oxygen saturation is 99%.     Chief Complaint: Exposure to STD    Pt is a 23 yo male presenting for STD testing. Denies any symptoms or known exposure. Last sexual encounter:     Exposure to STD  The patient's pertinent negatives include no genital injury, genital itching, genital lesions, pelvic pain, penile discharge, penile pain, priapism, scrotal swelling or testicular pain. This is a new problem. The current episode started today. The problem has been unchanged. The patient is experiencing no pain. Pertinent negatives include no abdominal pain, anorexia, chest pain, chills, constipation, coughing, diarrhea, discolored urine, dysuria, fever, flank pain, frequency, headaches, hematuria, hesitancy, joint pain, joint swelling, nausea, painful intercourse, rash, shortness of breath, sore throat, urgency, urinary retention or vomiting. Nothing aggravates the symptoms. He has tried nothing for the symptoms. He is sexually active.     Constitution: Negative for activity change, appetite change, chills, fatigue and fever.   HENT:  Negative for ear pain, congestion, postnasal drip, sinus pain, sinus pressure and sore throat.    Neck: Negative for neck pain and neck swelling.   Cardiovascular:  Negative for chest pain and sob on exertion.   Eyes:  Negative for eye trauma, eye discharge and eye redness.   Respiratory:  Negative for cough, shortness of breath and wheezing.    Gastrointestinal:  Negative for abdominal pain, nausea, vomiting, constipation and diarrhea.   Genitourinary:  Negative for dysuria, frequency, urgency, urine decreased, flank pain, penile discharge, penile pain, scrotal swelling, testicular pain and pelvic pain.   Musculoskeletal:  Negative for " pain, joint pain, joint swelling, abnormal ROM of joint and muscle ache.   Skin:  Negative for color change, rash, laceration and erythema.   Neurological:  Negative for dizziness, light-headedness, headaches, altered mental status and numbness.   Psychiatric/Behavioral:  Negative for altered mental status and confusion.       Objective:     Physical Exam   Constitutional: He is oriented to person, place, and time. He appears well-developed. He is cooperative.  Non-toxic appearance. He does not appear ill. No distress.      Comments:Pt sitting erect on examination table. No acute respiratory distress, no use of accessory muscles, no notice of nasal flaring.        HENT:   Head: Normocephalic and atraumatic.   Ears:   Right Ear: Hearing, tympanic membrane, external ear and ear canal normal.   Left Ear: Hearing, tympanic membrane, external ear and ear canal normal.   Nose: Nose normal. No mucosal edema, rhinorrhea, nasal deformity or congestion. No epistaxis. Right sinus exhibits no maxillary sinus tenderness and no frontal sinus tenderness. Left sinus exhibits no maxillary sinus tenderness and no frontal sinus tenderness.   Mouth/Throat: Uvula is midline, oropharynx is clear and moist and mucous membranes are normal. Mucous membranes are moist. No trismus in the jaw. Normal dentition. No uvula swelling. No oropharyngeal exudate, posterior oropharyngeal edema or posterior oropharyngeal erythema. Oropharynx is clear.   Eyes: Conjunctivae and lids are normal. Pupils are equal, round, and reactive to light. No scleral icterus. Extraocular movement intact   Neck: Trachea normal and phonation normal. Neck supple. No edema present. No erythema present. No neck rigidity present.   Cardiovascular: Normal rate, regular rhythm, normal heart sounds and normal pulses.   Pulmonary/Chest: Effort normal and breath sounds normal. No accessory muscle usage. No apnea, no tachypnea and no bradypnea. No respiratory distress. He has no  decreased breath sounds. He has no rhonchi.   Abdominal: Normal appearance.   Musculoskeletal: Normal range of motion.         General: No deformity. Normal range of motion.      Left hand: He exhibits laceration.   Neurological: He is alert and oriented to person, place, and time. He exhibits normal muscle tone. Coordination normal.   Skin: Skin is warm, dry, intact, not diaphoretic and not pale. Lacerations - upper ext.:  left handNo erythema   Psychiatric: His speech is normal and behavior is normal. Judgment and thought content normal.   Nursing note and vitals reviewed.      Assessment:     1. Screen for STD (sexually transmitted disease)        Plan:   I have reviewed the patient chart and pertinent past imaging/labs.      Screen for STD (sexually transmitted disease)  -     HIV 1/2 Ag/Ab (4th Gen)  -     Hepatitis Panel, Acute  -     C. trachomatis/N. gonorrhoeae by AMP DNA [facility]; Urine  -     Trichomonas vaginalis, RNA, Qual, Urine  -     Misc Sendout Test, Blood treponema [facility] antibiodies

## 2024-05-17 LAB — TREPONEMA PALLIDUM IGG+IGM AB [PRESENCE] IN SERUM OR PLASMA BY IMMUNOASSAY: NONREACTIVE

## 2024-05-18 LAB
C TRACH DNA SPEC QL NAA+PROBE: NOT DETECTED
N GONORRHOEA DNA SPEC QL NAA+PROBE: NOT DETECTED

## 2024-05-20 LAB
SPECIMEN SOURCE: NORMAL
T VAGINALIS RRNA SPEC QL NAA+PROBE: NEGATIVE

## 2024-06-14 ENCOUNTER — PATIENT OUTREACH (OUTPATIENT)
Dept: ADMINISTRATIVE | Facility: HOSPITAL | Age: 24
End: 2024-06-14
Payer: COMMERCIAL

## 2024-06-14 NOTE — PROGRESS NOTES
Health Maintenance Due   Topic Date Due    Pneumococcal Vaccines (Age 0-64) (1 of 1 - PPSV23 or PCV20) 02/20/2008    COVID-19 Vaccine (3 - 2023-24 season) 09/01/2023    Health Maintenance reviewed, updated and Links triggered. 'S up to date. ([place]) 6/14/24

## 2024-06-21 DIAGNOSIS — F90.0 ATTENTION DEFICIT HYPERACTIVITY DISORDER (ADHD), PREDOMINANTLY INATTENTIVE TYPE: ICD-10-CM

## 2024-06-21 RX ORDER — METHYLPHENIDATE HYDROCHLORIDE 18 MG/1
18 TABLET ORAL EVERY MORNING
Qty: 30 TABLET | Refills: 0 | Status: SHIPPED | OUTPATIENT
Start: 2024-06-21 | End: 2024-07-21

## 2024-06-21 NOTE — TELEPHONE ENCOUNTER
No care due was identified.  [facility] Embedded Care Due Messages. Reference number: [id number].   6/21/2024 12:21:18 PM CDT

## 2024-07-23 DIAGNOSIS — F90.0 ATTENTION DEFICIT HYPERACTIVITY DISORDER (ADHD), PREDOMINANTLY INATTENTIVE TYPE: ICD-10-CM

## 2024-07-23 RX ORDER — METHYLPHENIDATE HYDROCHLORIDE 18 MG/1
18 TABLET ORAL EVERY MORNING
Qty: 30 TABLET | Refills: 0 | Status: SHIPPED | OUTPATIENT
Start: 2024-07-23 | End: 2024-08-24

## 2024-07-23 NOTE — TELEPHONE ENCOUNTER
No care due was identified.  [facility] Embedded Care Due Messages. Reference number: [id number].   7/23/2024 3:30:17 PM CDT

## 2024-08-20 DIAGNOSIS — F90.0 ATTENTION DEFICIT HYPERACTIVITY DISORDER (ADHD), PREDOMINANTLY INATTENTIVE TYPE: ICD-10-CM

## 2024-08-20 RX ORDER — METHYLPHENIDATE HYDROCHLORIDE 18 MG/1
18 TABLET ORAL EVERY MORNING
Qty: 30 TABLET | Refills: 0 | Status: SHIPPED | OUTPATIENT
Start: 2024-08-20 | End: 2024-09-22

## 2024-08-20 NOTE — TELEPHONE ENCOUNTER
No care due was identified.  [facility] Embedded Care Due Messages. Reference number: [id number].   8/20/2024 1:24:26 PM CDT

## 2024-09-18 DIAGNOSIS — F90.0 ATTENTION DEFICIT HYPERACTIVITY DISORDER (ADHD), PREDOMINANTLY INATTENTIVE TYPE: ICD-10-CM

## 2024-09-18 RX ORDER — METHYLPHENIDATE HYDROCHLORIDE 18 MG/1
18 TABLET ORAL EVERY MORNING
Qty: 30 TABLET | Refills: 0 | Status: SHIPPED | OUTPATIENT
Start: 2024-09-18 | End: 2024-10-20

## 2024-09-18 NOTE — TELEPHONE ENCOUNTER
No care due was identified.  [facility] Embedded Care Due Messages. Reference number: [id number].   9/18/2024 11:43:07 AM CDT

## 2024-10-25 ENCOUNTER — OFFICE VISIT (OUTPATIENT)
Dept: INTERNAL MEDICINE | Facility: CLINIC | Age: 24
End: 2024-10-25
Payer: COMMERCIAL

## 2024-10-25 VITALS
BODY MASS INDEX: 21.71 KG/M2 | DIASTOLIC BLOOD PRESSURE: 77 MMHG | OXYGEN SATURATION: 98 % | SYSTOLIC BLOOD PRESSURE: 138 MMHG | HEART RATE: 65 BPM | WEIGHT: 127.19 LBS | HEIGHT: 64 IN

## 2024-10-25 DIAGNOSIS — F90.0 ATTENTION DEFICIT HYPERACTIVITY DISORDER (ADHD), PREDOMINANTLY INATTENTIVE TYPE: ICD-10-CM

## 2024-10-25 PROCEDURE — 99999 PR PBB SHADOW E&M-EST. PATIENT-LVL III: CPT | Mod: PBBFAC,,, | Performed by: STUDENT IN AN ORGANIZED HEALTH CARE EDUCATION/TRAINING PROGRAM

## 2024-10-25 RX ORDER — METHYLPHENIDATE HYDROCHLORIDE 36 MG/1
36 TABLET ORAL EVERY MORNING
Qty: 30 TABLET | Refills: 0 | Status: SHIPPED | OUTPATIENT
Start: 2024-10-25 | End: 2024-11-24

## 2024-10-25 NOTE — PROGRESS NOTES
TWO PATIENT IDENTIFIERS VERIFIED.  ALLERGIES VERIFIED.    After obtaining verbal consent from the patient, and per orders of Dr. VELÁSQUEZ, injection of FLUARIX QUADRIVALENT LOT [id number] 06/30/25 given IM in the RIGHT DELTOID by PAUL MANCIA LPN. Patient tolerated well and adhesive bandage applied. Patient instructed to remain in clinic for 15 minutes afterwards, and to report any adverse reaction to me immediately.

## 2024-10-25 NOTE — PROGRESS NOTES
"[facility]  Subjective:     Patient ID: Lane Blancas is a 24 y.o. male.  History of Present Illness    CHIEF COMPLAINT:  Patient presents today to discuss increasing the dosage of their ADHD medication.    ADHD:  He reports a history of ADHD, initially diagnosed in 8th grade with regular updates from a psychiatrist during high school. His last psychiatric visit was in 2021. He is currently taking methylphenidate 18mg controlled release, but reports that the current dose is insufficient in managing his symptoms. He experiences easy distractibility and difficulty completing planned work, often resulting in late-night work sessions supplemented with caffeine. He has experimented with doubling his current dose and found it to be more effective in symptom management. He expresses a desire for dose adjustment to better control his ADHD symptoms.  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     MENTAL HEALTH HISTORY:  He reports a history of taking Zoloft during the end of high school and throughout college, but is no longer on this medication. He is currently feeling "great" mentally and denies any current mental health concerns.    SOCIAL HISTORY:  He graduated from college in May with a degree in film.    SEXUAL HEALTH:  He reports having undergone STI testing in May. He denies any new sexual partners since his last STI screening. He is in a new relationship but has not had sexual encounters with new partners outside of this relationship.       Current Outpatient Medications   Medication Instructions    methylphenidate HCl 36 mg, Oral, Every morning     Objective:      Body mass index is 21.83 kg/m².  Vitals:    10/25/24 1341   BP: 138/77   Pulse: 65   SpO2: 98%   Weight: 57.7 kg (127 lb 3.3 oz)   Height: 5' 4" (1.626 m)   PainSc: 0-No pain     Physical Exam   Physical Exam    General: No acute distress. " Normal appearance.  Head: Normocephalic.  Eyes: Extraocular movements intact.  Cardiovascular: Normal rate and rhythm. No murmur heard.  Lungs: Pulmonary effort is normal. Normal breath sounds. No wheezing. No rales.  Abdomen: Flat.  Musculoskeletal: No edema lower extremities.  Skin: Warm. Dry.  Neurological: Alert.  Psychiatric: Normal mood. Normal behavior.  Vitals: Blood pressure: 138/77.       Assessment:       1. Attention deficit hyperactivity disorder (ADHD), predominantly inattentive type        Plan:   Assessment & Plan    ADHD:  - Assessed patient's response to current methylphenidate dose for ADHD management.  - Determined dose increase appropriate due to inadequate symptom control with increased work demands.  - Considered patient's well-established ADHD diagnosis, medication tolerance, and effectiveness of higher dose when patient self-adjusted.  - Will increase methylphenidate dose from 18mg to 36mg  - Increased methylphenidate controlled release from 18mg to 36mg daily in the morning.    BLOOD PRESSURE:  - systolic slightly elevated today, possible component of white coat.    - Discussed blood pressure targets, noting that less than 120/80 is considered normal.  - Patient to monitor blood pressure at home periodically, if possible.    LABS:  - Evaluated need for routine lab work, determining basic kidney and liver function tests appropriate for medication monitoring.  - Ordered CMP   - he was very low cholesterol at baseline.    FOLLOW UP:  - Follow up in 6 months (April 25th) for ADHD medication management e-visit.  - Follow up in 1 year for in-person visit.  - Contact office if dose adjustment is needed before next scheduled visit.        Attention deficit hyperactivity disorder (ADHD), predominantly inattentive type  -     methylphenidate HCl 36 MG CR tablet; Take 1 tablet (36 mg total) by mouth every morning.  Dispense: 30 tablet; Refill: 0  -     influenza (Flulaval, Fluzone, Fluarix) 45 mcg/0.5  mL IM vaccine (> or = 6 mo) 0.5 mL  -     COMPREHENSIVE METABOLIC PANEL; Future; Expected date: 10/25/2024  -     MYC E-Visit        Tests to Keep You Healthy    Tobacco Cessation: NO    No follow-ups on file. or sooner prn (as needed)          Abdoulaye Scottiejaime Dailey  [facility]  [address] [address]  Phone [phone]  Fax [phone]    Part of this note is dictated using the M*Modal Fluency Direct word recognition program. It may contain word recognition mistakes or wrong word substitutions (commonly he/she and is/was substitutions) that were missed on review.    Part of this note was generated with the assistance of ambient listening technology. Verbal consent was obtained by the patient and accompanying visitor(s) for the recording of patient appointment to facilitate this note. I attest to having reviewed and edited the generated note for accuracy, though some syntax or spelling errors may persist. Please contact the author of this note for any clarification.

## 2024-11-21 DIAGNOSIS — F90.0 ATTENTION DEFICIT HYPERACTIVITY DISORDER (ADHD), PREDOMINANTLY INATTENTIVE TYPE: ICD-10-CM

## 2024-11-21 RX ORDER — METHYLPHENIDATE HYDROCHLORIDE 36 MG/1
36 TABLET ORAL EVERY MORNING
Qty: 30 TABLET | Refills: 0 | Status: SHIPPED | OUTPATIENT
Start: 2024-11-21 | End: 2024-12-22

## 2024-11-21 NOTE — TELEPHONE ENCOUNTER
No care due was identified.  Health [facility] Embedded Care Due Messages. Reference number: [id number].   11/21/2024 10:33:26 AM CST

## 2024-12-22 DIAGNOSIS — F90.0 ATTENTION DEFICIT HYPERACTIVITY DISORDER (ADHD), PREDOMINANTLY INATTENTIVE TYPE: ICD-10-CM

## 2024-12-22 NOTE — TELEPHONE ENCOUNTER
No care due was identified.  Health [facility] Embedded Care Due Messages. Reference number: [id number].   12/22/2024 10:44:21 AM CST

## 2024-12-23 RX ORDER — METHYLPHENIDATE HYDROCHLORIDE 36 MG/1
36 TABLET ORAL EVERY MORNING
Qty: 30 TABLET | Refills: 0 | Status: SHIPPED | OUTPATIENT
Start: 2024-12-23 | End: 2025-01-23

## 2025-01-24 DIAGNOSIS — F90.0 ATTENTION DEFICIT HYPERACTIVITY DISORDER (ADHD), PREDOMINANTLY INATTENTIVE TYPE: ICD-10-CM

## 2025-01-24 RX ORDER — METHYLPHENIDATE HYDROCHLORIDE 36 MG/1
36 TABLET ORAL EVERY MORNING
Qty: 30 TABLET | Refills: 0 | Status: SHIPPED | OUTPATIENT
Start: 2025-01-24 | End: 2025-02-26

## 2025-01-24 NOTE — TELEPHONE ENCOUNTER
No care due was identified.  Health [facility] Embedded Care Due Messages. Reference number: [id number].   1/24/2025 11:43:15 AM CST

## 2025-01-24 NOTE — TELEPHONE ENCOUNTER
Refill Encounter  Allergies: Confirmed    PCP Visits: Recent Visits  Date Type Provider Dept   10/25/24 Office Visit Abdoulaye Dailey MD [facility] Internal Medicine   04/10/24 Office Visit Abdoulaye Dailey MD [facility] Internal Medicine   Showing recent visits within past 360 days and meeting all other requirements  Future Appointments  No visits were found meeting these conditions.  Showing future appointments within next 720 days and meeting all other requirements     Last 3 Blood Pressure:   BP Readings from Last 3 Encounters:   10/25/24 138/77   05/16/24 134/62   04/24/24 138/67     Preferred Pharmacy:   [facility]  [address] [place] [address]  Phone: [phone] Fax: [phone]    Requested RX:  Requested Prescriptions     Pending Prescriptions Disp Refills    methylphenidate HCl 36 MG CR tablet 30 tablet 0     Sig: Take 1 tablet (36 mg total) by mouth every morning.      RX Route: Normal

## 2025-02-25 DIAGNOSIS — F90.0 ATTENTION DEFICIT HYPERACTIVITY DISORDER (ADHD), PREDOMINANTLY INATTENTIVE TYPE: ICD-10-CM

## 2025-02-25 RX ORDER — METHYLPHENIDATE HYDROCHLORIDE 36 MG/1
36 TABLET ORAL EVERY MORNING
Qty: 30 TABLET | Refills: 0 | Status: SHIPPED | OUTPATIENT
Start: 2025-02-25 | End: 2025-03-28

## 2025-02-25 NOTE — TELEPHONE ENCOUNTER
No care due was identified.  [facility] Embedded Care Due Messages. Reference number: [id number].   2/25/2025 11:28:30 AM CST

## 2025-03-21 DIAGNOSIS — F90.0 ATTENTION DEFICIT HYPERACTIVITY DISORDER (ADHD), PREDOMINANTLY INATTENTIVE TYPE: ICD-10-CM

## 2025-03-21 RX ORDER — METHYLPHENIDATE HYDROCHLORIDE 36 MG/1
36 TABLET ORAL EVERY MORNING
Qty: 30 TABLET | Refills: 0 | Status: SHIPPED | OUTPATIENT
Start: 2025-03-21 | End: 2025-04-20

## 2025-03-21 NOTE — TELEPHONE ENCOUNTER
Refill Encounter    PCP Visits: Recent Visits  Date Type Provider Dept   10/25/24 Office Visit Abdoulaye Dailey MD [facility] Internal Medicine   04/10/24 Office Visit Abdoulaye Dailey MD [facility] Internal Medicine   Showing recent visits within past 360 days and meeting all other requirements  Future Appointments  No visits were found meeting these conditions.  Showing future appointments within next 720 days and meeting all other requirements      Last 3 Blood Pressure:   BP Readings from Last 3 Encounters:   10/25/24 138/77   05/16/24 134/62   04/24/24 138/67     Preferred Pharmacy:   [facility] [address] [place] [address]  Phone: [phone] Fax: [phone]    Requested RX:  Requested Prescriptions     Pending Prescriptions Disp Refills    methylphenidate HCl 36 MG CR tablet 30 tablet 0     Sig: Take 1 tablet (36 mg total) by mouth every morning.      RX Route: Normal

## 2025-03-21 NOTE — TELEPHONE ENCOUNTER
No care due was identified.  [facility] Embedded Care Due Messages. Reference number: [id number].   3/21/2025 10:51:17 AM CDT

## 2025-04-21 DIAGNOSIS — F90.0 ATTENTION DEFICIT HYPERACTIVITY DISORDER (ADHD), PREDOMINANTLY INATTENTIVE TYPE: ICD-10-CM

## 2025-04-21 RX ORDER — METHYLPHENIDATE HYDROCHLORIDE 36 MG/1
36 TABLET ORAL EVERY MORNING
Qty: 30 TABLET | Refills: 0 | Status: SHIPPED | OUTPATIENT
Start: 2025-04-21 | End: 2025-05-25

## 2025-04-21 NOTE — TELEPHONE ENCOUNTER
Refill Encounter    PCP Visits: Recent Visits  Date Type Provider Dept   10/25/24 Office Visit Abdoulaye Dailey MD [facility] Internal Medicine   Showing recent visits within past 360 days and meeting all other requirements  Future Appointments  No visits were found meeting these conditions.  Showing future appointments within next 720 days and meeting all other requirements      Last 3 Blood Pressure:   BP Readings from Last 3 Encounters:   10/25/24 138/77   05/16/24 134/62   04/24/24 138/67     Preferred Pharmacy:   [facility] [address] [place] [address]  Phone: [phone] Fax: [phone]    Requested RX:  Requested Prescriptions     Pending Prescriptions Disp Refills    methylphenidate HCl 36 MG CR tablet 30 tablet 0     Sig: Take 1 tablet (36 mg total) by mouth every morning.      RX Route: Normal

## 2025-04-21 NOTE — TELEPHONE ENCOUNTER
No care due was identified.  [facility] Embedded Care Due Messages. Reference number: [id number].   4/21/2025 7:12:20 AM CDT

## 2025-05-08 ENCOUNTER — OFFICE VISIT (OUTPATIENT)
Dept: INTERNAL MEDICINE | Facility: CLINIC | Age: 25
End: 2025-05-08
Payer: COMMERCIAL

## 2025-05-08 VITALS
SYSTOLIC BLOOD PRESSURE: 135 MMHG | HEIGHT: 64 IN | WEIGHT: 121.69 LBS | HEART RATE: 79 BPM | DIASTOLIC BLOOD PRESSURE: 80 MMHG | BODY MASS INDEX: 20.78 KG/M2 | OXYGEN SATURATION: 100 %

## 2025-05-08 DIAGNOSIS — F90.0 ATTENTION DEFICIT HYPERACTIVITY DISORDER (ADHD), PREDOMINANTLY INATTENTIVE TYPE: ICD-10-CM

## 2025-05-08 DIAGNOSIS — Z00.00 ANNUAL PHYSICAL EXAM: Primary | ICD-10-CM

## 2025-05-08 DIAGNOSIS — H00.019 HORDEOLUM, UNSPECIFIED HORDEOLUM TYPE, UNSPECIFIED LATERALITY: ICD-10-CM

## 2025-05-08 PROCEDURE — 1159F MED LIST DOCD IN RCRD: CPT | Mod: CPTII,S$GLB,, | Performed by: STUDENT IN AN ORGANIZED HEALTH CARE EDUCATION/TRAINING PROGRAM

## 2025-05-08 PROCEDURE — 3075F SYST BP GE 130 - 139MM HG: CPT | Mod: CPTII,S$GLB,, | Performed by: STUDENT IN AN ORGANIZED HEALTH CARE EDUCATION/TRAINING PROGRAM

## 2025-05-08 PROCEDURE — 99395 PREV VISIT EST AGE 18-39: CPT | Mod: S$GLB,,, | Performed by: STUDENT IN AN ORGANIZED HEALTH CARE EDUCATION/TRAINING PROGRAM

## 2025-05-08 PROCEDURE — 3008F BODY MASS INDEX DOCD: CPT | Mod: CPTII,S$GLB,, | Performed by: STUDENT IN AN ORGANIZED HEALTH CARE EDUCATION/TRAINING PROGRAM

## 2025-05-08 PROCEDURE — 3079F DIAST BP 80-89 MM HG: CPT | Mod: CPTII,S$GLB,, | Performed by: STUDENT IN AN ORGANIZED HEALTH CARE EDUCATION/TRAINING PROGRAM

## 2025-05-08 PROCEDURE — 99999 PR PBB SHADOW E&M-EST. PATIENT-LVL III: CPT | Mod: PBBFAC,,, | Performed by: STUDENT IN AN ORGANIZED HEALTH CARE EDUCATION/TRAINING PROGRAM

## 2025-05-08 RX ORDER — POLYMYXIN B SULFATE AND TRIMETHOPRIM 1; 10000 MG/ML; [USP'U]/ML
SOLUTION OPHTHALMIC
COMMUNITY
Start: 2025-04-22

## 2025-05-08 RX ORDER — CIPROFLOXACIN AND DEXAMETHASONE 3; 1 MG/ML; MG/ML
SUSPENSION/ DROPS AURICULAR (OTIC)
COMMUNITY
Start: 2025-05-07

## 2025-05-08 NOTE — PROGRESS NOTES
"[facility]  Subjective:     Patient ID: Lane Blancas is a 25 y.o. male.  History of Present Illness    CHIEF COMPLAINT:  Patient presents today for follow-up on eye issues and annual physical exam    OCULAR:  He reports left eye redness and puffiness starting three weeks ago, with morning discharge and a stye on the bottom eyelid. Symptoms improve with eye drops but return the next day. He experiences occasional discomfort with eye movement. The right eye shows only mild allergy-related redness. He denies use of contact lenses or glasses. Polymyxin trimethoprim eye drops were prescribed at urgent care for suspected conjunctivitis.    ENT:  He reports right ear infection with swelling and pain, treated with ear drops. His allergy symptoms have recently worsened, despite no significant prior allergy history. He has been using OTC nasal spray and Zyrtec for one week.    ENVIRONMENTAL FACTORS:  His symptoms appear related to exposure to a poorly maintained air conditioning unit in his sleeping quarters.     INTEGUMENTARY:  He reports a pimple on his nose. Recent bumps on his head have resolved.    MEDICATIONS:  He continues methylphenidate for ADHD with good effect.       Current Outpatient Medications   Medication Instructions    ciprofloxacin-dexAMETHasone 0.3-0.1% (CIPRODEX) 0.3-0.1 % DrpS     methylphenidate HCl 36 mg, Oral, Every morning    polymyxin B sulf-trimethoprim (POLYTRIM) 10,000 unit- 1 mg/mL Drop INSTILL 2 DROPS INTO AFFECTED EYE(S) 3 TIMES A DAY FOR 7 DAYS       Objective:      Body mass index is 20.89 kg/m².  Vitals:    05/08/25 1343 05/08/25 1358   BP: (!) 143/85 135/80   Pulse: 79    SpO2: 100%    Weight: 55.2 kg (121 lb 11.1 oz)    Height: 5' 4" (1.626 m)    PainSc: 0-No pain      Physical Exam   Physical Exam    General: No acute distress. Normal appearance.  Head: Normocephalic.  Eyes: Extraocular movements intact.  Neck: No thyromegaly. No lumps in " neck.  Cardiovascular: Normal rate and rhythm. No murmur heard.  Lungs: Pulmonary effort is normal. Normal breath sounds. No wheezing. No rales.  Abdomen: Flat.  Musculoskeletal: No edema lower extremities.  Skin: Warm. Dry.  Neurological: Alert.  Psychiatric: Normal mood. Normal behavior.  Vitals: Blood pressure: 125/80.       Assessment:      1. Annual physical exam    2. Attention deficit hyperactivity disorder (ADHD), predominantly inattentive type    3. Hordeolum, unspecified hordeolum type, unspecified laterality             Plan (dictated):   Patient presents for annual physical exam and for stye and conjunctivitis.   He reports that starting about three weeks ago, he developed redness of the left eye. He went and got evaluated at urgent care and was given polymyxin trimethoprim eye drops. Condition initially resolved with treatment, then recurred and required additional treatment. He also noted development of a hordeolum recently, which has since resolved. Given symptoms have resolved, no additional treatment is needed at this time. Though if condition returns, I advise that he seek evaluation by ophthalmology.  He does not wear glasses or contact lenses.    Otherwise, he continues to take methylphenidate for ADHD and finds this effective. He does not need a refill today.    He has no additional chronic medical problems.    Patient to follow up annually in person and for six-month e-visit for ADHD evaluation.       Plan continued (generated by ambient listening):  Assessment & Plan      PLAN SUMMARY:  No medication adjustments or additional labs needed at this time.  Continue current methylphenidate regimen for ADHD management.  Apply warm compress to affected eye area if condition recurs.  Continue allergy medications (nasal spray, eye drops, cetirizine) as needed.  Seek evaluation at ophthalmology clinic if eye symptoms worsen or recur.  Send message for methylphenidate refill when needed.  Monitor BP at  home when relaxed, aiming for readings of 120/80 or less.  Follow-up annually in person for general check-up.  Schedule 6-month e-visit for ADHD evaluation.    BLOOD PRESSURE MANAGEMENT:  Evaluated BP, noting slight elevation but improved with repeated measurements.  Educated on normal BP ranges (120/80 or less).  Patient to monitor BP at home when relaxed, aiming for readings of 120/80 or less.    FOLLOW-UP:  Scheduled follow-up annually in person and for 6-month e-visit for ADHD evaluation.         Diagnoses and associated orders:   Annual physical exam    Attention deficit hyperactivity disorder (ADHD), predominantly inattentive type  -     MYC E-VISIT    Hordeolum, unspecified hordeolum type, unspecified laterality        Tests to Keep You Healthy    Tobacco Cessation: NO    No follow-ups on file. or sooner prn (as needed)          Abdoulaye Dailey  [facility]  [address] [address]  Phone [phone]  Fax [phone]    Part of this note is dictated using the M*Modal Fluency Direct word recognition program. It may contain word recognition mistakes or wrong word substitutions (commonly he/she and is/was substitutions) that were missed on review.    Part of this note was generated with the assistance of ambient listening technology. Verbal consent was obtained by the patient and accompanying visitor(s) for the recording of patient appointment to facilitate this note. I attest to having reviewed and edited the generated note for accuracy, though some syntax or spelling errors may persist. Please contact the author of this note for any clarification.

## 2025-05-26 DIAGNOSIS — F90.0 ATTENTION DEFICIT HYPERACTIVITY DISORDER (ADHD), PREDOMINANTLY INATTENTIVE TYPE: ICD-10-CM

## 2025-05-27 RX ORDER — METHYLPHENIDATE HYDROCHLORIDE 36 MG/1
36 TABLET ORAL EVERY MORNING
Qty: 30 TABLET | Refills: 0 | Status: SHIPPED | OUTPATIENT
Start: 2025-05-27 | End: 2025-06-27

## 2025-05-27 NOTE — TELEPHONE ENCOUNTER
No care due was identified.  Health [facility] Embedded Care Due Messages. Reference number: [id number].   5/26/2025 11:07:10 PM CDT

## 2025-07-15 DIAGNOSIS — F90.0 ATTENTION DEFICIT HYPERACTIVITY DISORDER (ADHD), PREDOMINANTLY INATTENTIVE TYPE: ICD-10-CM

## 2025-07-15 RX ORDER — METHYLPHENIDATE HYDROCHLORIDE 36 MG/1
36 TABLET ORAL EVERY MORNING
Qty: 30 TABLET | Refills: 0 | Status: SHIPPED | OUTPATIENT
Start: 2025-07-15 | End: 2025-08-15

## 2025-07-15 NOTE — TELEPHONE ENCOUNTER
No care due was identified.  Health [facility] Embedded Care Due Messages. Reference number: [id number].   7/15/2025 8:39:55 AM CDT

## 2025-07-15 NOTE — TELEPHONE ENCOUNTER
Medication Pending   Allergies Confirm   LOV   05/08/2025      Refill Encounter    PCP Visits: Recent Visits  Date Type Provider Dept   05/08/25 Office Visit Abdoulaye Dailey MD [facility] Internal Medicine   10/25/24 Office Visit Abdoulaye Dailey MD [facility] Internal Medicine   Showing recent visits within past 360 days and meeting all other requirements  Future Appointments  No visits were found meeting these conditions.  Showing future appointments within next 720 days and meeting all other requirements      Last 3 Blood Pressure:   BP Readings from Last 3 Encounters:   05/08/25 135/80   10/25/24 138/77   05/16/24 134/62     Preferred Pharmacy:   [facility] [place] [address]  Phone: [phone] Fax: [phone]    Requested RX:  Requested Prescriptions     Pending Prescriptions Disp Refills    methylphenidate HCl 36 MG CR tablet 30 tablet 0     Sig: Take 1 tablet (36 mg total) by mouth every morning.      RX Route: Normal

## 2025-08-11 DIAGNOSIS — F90.0 ATTENTION DEFICIT HYPERACTIVITY DISORDER (ADHD), PREDOMINANTLY INATTENTIVE TYPE: ICD-10-CM

## 2025-08-12 RX ORDER — METHYLPHENIDATE HYDROCHLORIDE 36 MG/1
36 TABLET ORAL EVERY MORNING
Qty: 30 TABLET | Refills: 0 | Status: SHIPPED | OUTPATIENT
Start: 2025-08-12 | End: 2025-09-12

## (undated) DEVICE — GAUZE SPONGE 4X4 12PLY

## (undated) DEVICE — BANDAGE MATRIX HK LOOP 4IN 5YD

## (undated) DEVICE — CORD FOR BIPOLAR FORCEPS 12

## (undated) DEVICE — TOURNIQUET SB QC DP 18X4IN

## (undated) DEVICE — FORCEP STRAIGHT DISP

## (undated) DEVICE — SLING ARM LARGE FOAM STRAP

## (undated) DEVICE — DRAPE STERI-DRAPE 1000 17X11IN

## (undated) DEVICE — Device

## (undated) DEVICE — GLOVE BIOGEL PI MICRO INDIC 7

## (undated) DEVICE — SPLINT PLASTER EXT FAST 4X15

## (undated) DEVICE — PAD CAST SPECIALIST STRL 4

## (undated) DEVICE — SUT PROLENE 4-0 MONO 18IN

## (undated) DEVICE — GLOVE BIOGEL ECLIPSE SZ 7

## (undated) DEVICE — DRESSING N ADH OIL EMUL 3X3

## (undated) DEVICE — SOL POVIDONE SCRUB IODINE 4 OZ

## (undated) DEVICE — SOL NACL IRR 1000ML BTL

## (undated) DEVICE — SUT ETHILON 4-0 BLK MONO